# Patient Record
Sex: FEMALE | Race: ASIAN | NOT HISPANIC OR LATINO | URBAN - METROPOLITAN AREA
[De-identification: names, ages, dates, MRNs, and addresses within clinical notes are randomized per-mention and may not be internally consistent; named-entity substitution may affect disease eponyms.]

---

## 2018-04-02 ENCOUNTER — INPATIENT (INPATIENT)
Facility: HOSPITAL | Age: 33
LOS: 1 days | Discharge: HOME | End: 2018-04-04
Attending: SURGERY | Admitting: SURGERY
Payer: MEDICAID

## 2018-04-02 VITALS
OXYGEN SATURATION: 100 % | TEMPERATURE: 99 F | DIASTOLIC BLOOD PRESSURE: 58 MMHG | RESPIRATION RATE: 16 BRPM | HEART RATE: 82 BPM | SYSTOLIC BLOOD PRESSURE: 108 MMHG

## 2018-04-02 DIAGNOSIS — Z98.89 OTHER SPECIFIED POSTPROCEDURAL STATES: Chronic | ICD-10-CM

## 2018-04-02 NOTE — ED ADULT TRIAGE NOTE - CHIEF COMPLAINT QUOTE
pt comes to ED for R sided lower abdominal pain x3 days pt denies NVD pain on urination or blood in urine. pt denies taking anything for pain or PCP. pt LMP was 3/31/18 last BM was today. pt VSS pt appears comfortable NAD

## 2018-04-03 DIAGNOSIS — K37 UNSPECIFIED APPENDICITIS: ICD-10-CM

## 2018-04-03 LAB
ALBUMIN SERPL ELPH-MCNC: 4.2 G/DL — SIGNIFICANT CHANGE UP (ref 3.3–5)
ALP SERPL-CCNC: 49 U/L — SIGNIFICANT CHANGE UP (ref 40–120)
ALT FLD-CCNC: 13 U/L — SIGNIFICANT CHANGE UP (ref 4–33)
APPEARANCE UR: CLEAR — SIGNIFICANT CHANGE UP
AST SERPL-CCNC: 13 U/L — SIGNIFICANT CHANGE UP (ref 4–32)
BASOPHILS # BLD AUTO: 0.05 K/UL — SIGNIFICANT CHANGE UP (ref 0–0.2)
BASOPHILS NFR BLD AUTO: 0.4 % — SIGNIFICANT CHANGE UP (ref 0–2)
BILIRUB SERPL-MCNC: 0.2 MG/DL — SIGNIFICANT CHANGE UP (ref 0.2–1.2)
BILIRUB UR-MCNC: NEGATIVE — SIGNIFICANT CHANGE UP
BLD GP AB SCN SERPL QL: NEGATIVE — SIGNIFICANT CHANGE UP
BLOOD UR QL VISUAL: HIGH
BUN SERPL-MCNC: 14 MG/DL — SIGNIFICANT CHANGE UP (ref 7–23)
CALCIUM SERPL-MCNC: 8.8 MG/DL — SIGNIFICANT CHANGE UP (ref 8.4–10.5)
CHLORIDE SERPL-SCNC: 102 MMOL/L — SIGNIFICANT CHANGE UP (ref 98–107)
CO2 SERPL-SCNC: 30 MMOL/L — SIGNIFICANT CHANGE UP (ref 22–31)
COLOR SPEC: YELLOW — SIGNIFICANT CHANGE UP
CREAT SERPL-MCNC: 0.73 MG/DL — SIGNIFICANT CHANGE UP (ref 0.5–1.3)
EOSINOPHIL # BLD AUTO: 0.27 K/UL — SIGNIFICANT CHANGE UP (ref 0–0.5)
EOSINOPHIL NFR BLD AUTO: 2.3 % — SIGNIFICANT CHANGE UP (ref 0–6)
GLUCOSE SERPL-MCNC: 105 MG/DL — HIGH (ref 70–99)
GLUCOSE UR-MCNC: NEGATIVE — SIGNIFICANT CHANGE UP
HCT VFR BLD CALC: 36.1 % — SIGNIFICANT CHANGE UP (ref 34.5–45)
HGB BLD-MCNC: 11.5 G/DL — SIGNIFICANT CHANGE UP (ref 11.5–15.5)
IMM GRANULOCYTES # BLD AUTO: 0.04 # — SIGNIFICANT CHANGE UP
IMM GRANULOCYTES NFR BLD AUTO: 0.3 % — SIGNIFICANT CHANGE UP (ref 0–1.5)
INR BLD: 1.2 — HIGH (ref 0.88–1.17)
KETONES UR-MCNC: NEGATIVE — SIGNIFICANT CHANGE UP
LEUKOCYTE ESTERASE UR-ACNC: SIGNIFICANT CHANGE UP
LYMPHOCYTES # BLD AUTO: 2.49 K/UL — SIGNIFICANT CHANGE UP (ref 1–3.3)
LYMPHOCYTES # BLD AUTO: 21 % — SIGNIFICANT CHANGE UP (ref 13–44)
MCHC RBC-ENTMCNC: 27.6 PG — SIGNIFICANT CHANGE UP (ref 27–34)
MCHC RBC-ENTMCNC: 31.9 % — LOW (ref 32–36)
MCV RBC AUTO: 86.8 FL — SIGNIFICANT CHANGE UP (ref 80–100)
MONOCYTES # BLD AUTO: 0.9 K/UL — SIGNIFICANT CHANGE UP (ref 0–0.9)
MONOCYTES NFR BLD AUTO: 7.6 % — SIGNIFICANT CHANGE UP (ref 2–14)
MUCOUS THREADS # UR AUTO: SIGNIFICANT CHANGE UP
NEUTROPHILS # BLD AUTO: 8.11 K/UL — HIGH (ref 1.8–7.4)
NEUTROPHILS NFR BLD AUTO: 68.4 % — SIGNIFICANT CHANGE UP (ref 43–77)
NITRITE UR-MCNC: NEGATIVE — SIGNIFICANT CHANGE UP
NRBC # FLD: 0 — SIGNIFICANT CHANGE UP
PH UR: 7 — SIGNIFICANT CHANGE UP (ref 4.6–8)
PLATELET # BLD AUTO: 394 K/UL — SIGNIFICANT CHANGE UP (ref 150–400)
PMV BLD: 9.1 FL — SIGNIFICANT CHANGE UP (ref 7–13)
POTASSIUM SERPL-MCNC: 4.2 MMOL/L — SIGNIFICANT CHANGE UP (ref 3.5–5.3)
POTASSIUM SERPL-SCNC: 4.2 MMOL/L — SIGNIFICANT CHANGE UP (ref 3.5–5.3)
PROT SERPL-MCNC: 7.3 G/DL — SIGNIFICANT CHANGE UP (ref 6–8.3)
PROT UR-MCNC: NEGATIVE MG/DL — SIGNIFICANT CHANGE UP
PROTHROM AB SERPL-ACNC: 13.8 SEC — HIGH (ref 9.8–13.1)
RBC # BLD: 4.16 M/UL — SIGNIFICANT CHANGE UP (ref 3.8–5.2)
RBC # FLD: 12.2 % — SIGNIFICANT CHANGE UP (ref 10.3–14.5)
RBC CASTS # UR COMP ASSIST: 0.2 — SIGNIFICANT CHANGE UP (ref 0–?)
RH IG SCN BLD-IMP: POSITIVE — SIGNIFICANT CHANGE UP
SODIUM SERPL-SCNC: 141 MMOL/L — SIGNIFICANT CHANGE UP (ref 135–145)
SP GR SPEC: 1.01 — SIGNIFICANT CHANGE UP (ref 1–1.04)
SQUAMOUS # UR AUTO: SIGNIFICANT CHANGE UP
UROBILINOGEN FLD QL: 0.2 MG/DL — SIGNIFICANT CHANGE UP
WBC # BLD: 11.86 K/UL — HIGH (ref 3.8–10.5)
WBC # FLD AUTO: 11.86 K/UL — HIGH (ref 3.8–10.5)
WBC UR QL: SIGNIFICANT CHANGE UP (ref 0–?)

## 2018-04-03 PROCEDURE — 74177 CT ABD & PELVIS W/CONTRAST: CPT | Mod: 26

## 2018-04-03 PROCEDURE — 99223 1ST HOSP IP/OBS HIGH 75: CPT | Mod: GC

## 2018-04-03 RX ORDER — ENOXAPARIN SODIUM 100 MG/ML
40 INJECTION SUBCUTANEOUS EVERY 24 HOURS
Qty: 0 | Refills: 0 | Status: DISCONTINUED | OUTPATIENT
Start: 2018-04-03 | End: 2018-04-04

## 2018-04-03 RX ORDER — SODIUM CHLORIDE 9 MG/ML
1000 INJECTION INTRAMUSCULAR; INTRAVENOUS; SUBCUTANEOUS
Qty: 0 | Refills: 0 | Status: DISCONTINUED | OUTPATIENT
Start: 2018-04-03 | End: 2018-04-04

## 2018-04-03 RX ORDER — SODIUM CHLORIDE 9 MG/ML
1000 INJECTION, SOLUTION INTRAVENOUS
Qty: 0 | Refills: 0 | Status: DISCONTINUED | OUTPATIENT
Start: 2018-04-03 | End: 2018-04-03

## 2018-04-03 RX ORDER — SODIUM CHLORIDE 9 MG/ML
1000 INJECTION INTRAMUSCULAR; INTRAVENOUS; SUBCUTANEOUS ONCE
Qty: 0 | Refills: 0 | Status: COMPLETED | OUTPATIENT
Start: 2018-04-03 | End: 2018-04-03

## 2018-04-03 RX ORDER — PIPERACILLIN AND TAZOBACTAM 4; .5 G/20ML; G/20ML
3.38 INJECTION, POWDER, LYOPHILIZED, FOR SOLUTION INTRAVENOUS ONCE
Qty: 0 | Refills: 0 | Status: COMPLETED | OUTPATIENT
Start: 2018-04-03 | End: 2018-04-03

## 2018-04-03 RX ORDER — KETOROLAC TROMETHAMINE 30 MG/ML
30 SYRINGE (ML) INJECTION ONCE
Qty: 0 | Refills: 0 | Status: DISCONTINUED | OUTPATIENT
Start: 2018-04-03 | End: 2018-04-03

## 2018-04-03 RX ORDER — PIPERACILLIN AND TAZOBACTAM 4; .5 G/20ML; G/20ML
3.38 INJECTION, POWDER, LYOPHILIZED, FOR SOLUTION INTRAVENOUS EVERY 8 HOURS
Qty: 0 | Refills: 0 | Status: DISCONTINUED | OUTPATIENT
Start: 2018-04-03 | End: 2018-04-04

## 2018-04-03 RX ADMIN — Medication 30 MILLIGRAM(S): at 00:31

## 2018-04-03 RX ADMIN — PIPERACILLIN AND TAZOBACTAM 25 GRAM(S): 4; .5 INJECTION, POWDER, LYOPHILIZED, FOR SOLUTION INTRAVENOUS at 12:23

## 2018-04-03 RX ADMIN — PIPERACILLIN AND TAZOBACTAM 200 GRAM(S): 4; .5 INJECTION, POWDER, LYOPHILIZED, FOR SOLUTION INTRAVENOUS at 04:26

## 2018-04-03 RX ADMIN — SODIUM CHLORIDE 100 MILLILITER(S): 9 INJECTION, SOLUTION INTRAVENOUS at 09:23

## 2018-04-03 RX ADMIN — ENOXAPARIN SODIUM 40 MILLIGRAM(S): 100 INJECTION SUBCUTANEOUS at 11:31

## 2018-04-03 RX ADMIN — SODIUM CHLORIDE 75 MILLILITER(S): 9 INJECTION INTRAMUSCULAR; INTRAVENOUS; SUBCUTANEOUS at 10:43

## 2018-04-03 RX ADMIN — Medication 30 MILLIGRAM(S): at 00:45

## 2018-04-03 RX ADMIN — PIPERACILLIN AND TAZOBACTAM 25 GRAM(S): 4; .5 INJECTION, POWDER, LYOPHILIZED, FOR SOLUTION INTRAVENOUS at 19:24

## 2018-04-03 RX ADMIN — SODIUM CHLORIDE 1000 MILLILITER(S): 9 INJECTION INTRAMUSCULAR; INTRAVENOUS; SUBCUTANEOUS at 00:31

## 2018-04-03 RX ADMIN — SODIUM CHLORIDE 100 MILLILITER(S): 9 INJECTION, SOLUTION INTRAVENOUS at 08:30

## 2018-04-03 RX ADMIN — SODIUM CHLORIDE 75 MILLILITER(S): 9 INJECTION INTRAMUSCULAR; INTRAVENOUS; SUBCUTANEOUS at 19:24

## 2018-04-03 RX ADMIN — SODIUM CHLORIDE 75 MILLILITER(S): 9 INJECTION INTRAMUSCULAR; INTRAVENOUS; SUBCUTANEOUS at 11:30

## 2018-04-03 NOTE — CONSULT NOTE ADULT - ASSESSMENT
33F PSHx  x3 presenting w/ RLQ abdominal pain x3days, leukocytosis, CT w/ acute non-perforated appendicitis.     Explained recommendation for appendectomy to patient. Discussed risks of surgery vs. non-operative management; patient expressed that she does not want surgery for cosmetic reasons and as her pain is gone w/ the one dose of iv abx.  Reviewed minimally invasive nature of laparoscopic appendectomy, as well as the potential risks of perforation, recurrence and possible need for eventual surgery.      Discussed w/ Attending Dr. Horner.         JODI Armendariz MD (PGY2)    (Please page [facility] B team Surgery, [phone] for questions/concerns.)

## 2018-04-03 NOTE — ED PROVIDER NOTE - OBJECTIVE STATEMENT
32 y/o female no pmh c/o RLQ abd pain x3 days. Pt admits to constant pain, no aggravating or relieving factors. Pt states that she had her menstrual cycle this weekend and thought it would go away on its own. Pt finished her period x1 day ago but pain became  worse today. Pt denies chest pain, sob, n/v/d, dysuria, vaginal bleeding or discharge, numbness, tingling ,weakness, syncope, fever or chills.

## 2018-04-03 NOTE — H&P ADULT - ATTENDING COMMENTS
Pt seen and examined.  Agree with resident eval and plan.  Pt with 3 days of abdominal pain localized now to RLQ.  Pt with CT scan findings of acute appendicitis with thickened cecum and surrounding fluid and inflammation.  Pt reportedly feels much better and alleviation of symptoms likely due to perforation.  Presently still localized.  D/w pt risks and benefits of surgical removal laparoscopically and possibly open and pt wants to treat nonoperatively with antibiotics.  Pt understands that her condition may worsen requiring further intervention either open surgery or IR guided drainage.  Pt understands and insisting on nonoperative course.  IV antibiotics and NPO.

## 2018-04-03 NOTE — H&P ADULT - NSHPPHYSICALEXAM_GEN_ALL_CORE
T(C): 37.1 (04-03-18 @ 06:31)  HR: 68 (04-03-18 @ 06:31) (68 - 82)  BP: 109/68 (04-03-18 @ 06:31) (108/58 - 111/70)  RR: 18 (04-03-18 @ 06:31) (16 - 18)  SpO2: 100% (04-03-18 @ 06:31) (100% - 100%)  Wt(kg): --  Tmax: T(C): , Max: 37.1 (04-03-18 @ 06:31)  Wt(kg): --    General: well developed, well nourished, NAD  Neuro: alert and oriented, no focal deficits, moves all extremities spontaneously  HEENT: NCAT, anicteric, mucosa moist  Respiratory: airway patent, respirations unlabored  Abdomen: soft, nondistended, tender RLQ; no guarding/rebound  Extremities: no edema, sensation and movement grossly intact  Skin: warm, dry, appropriate color

## 2018-04-03 NOTE — ED ADULT NURSE NOTE - OBJECTIVE STATEMENT
Pt received to int-10c A&Ox3 c/o RLQ pain X1week worse today. Pt states she was on her cycle this week, which she thought was contributing to her pain however her cycle ended one day ago and pain was still present worsening since waking up this AM. Denies n/v/d, fever, chills, urinary symptoms. Pt appears comfortably in stretcher, respirations even and unlabored, nad noted at this time. 20G IV placed in R AC, blood work and urine sent, awaiting CT scan. Safety and comfort maintained.

## 2018-04-03 NOTE — CONSULT NOTE ADULT - SUBJECTIVE AND OBJECTIVE BOX
General Surgery Consult Note  B team [id number]    CC: 33y old Female admitted with a chief complaint of abdominal pain    HPI:  33F PSHx  x3 presenting to [facility] ED the night of  complaining of abdominal pain x3 days. Reports pain started 2-3 days prior to presentation, always located in RLQ. Pt admits to constant pain, no aggravating or relieving factors. Pt states that she had her menstrual cycle this weekend and thought it would go away on its own. Pt finished her period x1 day ago but pain became  worse today. Pt denies chest pain, sob, n/v/d, dysuria, vaginal bleeding or discharge, numbness, tingling ,weakness, syncope, fever or chills.    PMHx: Hypotension  Placenta abruptio    PSHx: S/P LASIK surgery  H/O  section    Medications (inpatient):   Medications (PRN):  Allergies: No Known Allergies  (Intolerances: )  Social Hx:     Physical Exam  T(C): 37.1 (18 @ 06:31)  HR: 68 (18 @ 06:31) (68 - 82)  BP: 109/68 (18 @ 06:31) (108/58 - 111/70)  RR: 18 (18 @ 06:31) (16 - 18)  SpO2: 100% (18 @ 06:31) (100% - 100%)  Wt(kg): --  Tmax: T(C): , Max: 37.1 (18 @ 06:31)  Wt(kg): --    General: well developed, well nourished, NAD  Neuro: alert and oriented, no focal deficits, moves all extremities spontaneously  HEENT: NCAT, EOMI, anicteric, mucosa moist  Respiratory: airway patent, respirations unlabored  CVS: regular rate and rhythm  Abdomen: soft, nontender, nondistended  Extremities: no edema, sensation and movement grossly intact  Skin: warm, dry, appropriate color    Labs:                        11.5   11.86 )-----------( 394      ( 2018 00:20 )             36.1     PT/INR - ( 2018 04:00 )   PT: 13.8 SEC;   INR: 1.20                141  |  102  |  14  ----------------------------<  105<H>  4.2   |  30  |  0.73    Ca    8.8      2018 00:20    TPro  7.3  /  Alb  4.2  /  TBili  0.2  /  DBili  x   /  AST  13  /  ALT  13  /  AlkPhos  49  04-03    Urinalysis Basic - ( 2018 01:20 )    Color: YELLOW / Appearance: CLEAR / S.015 / pH: 7.0  Gluc: NEGATIVE / Ketone: NEGATIVE  / Bili: NEGATIVE / Urobili: 0.2 mg/dL   Blood: SMALL / Protein: NEGATIVE mg/dL / Nitrite: NEGATIVE   Leuk Esterase: TRACE / RBC: 0.2 / WBC 10-25   Sq Epi: OCC / Non Sq Epi: x / Bacteria: x            Imaging and other studies: General Surgery Consult Note  B team [id number]    Consultant: Dr. Sherman Horner    CC: 33y old Female admitted with a chief complaint of abdominal pain    HPI:  33F PSHx  x3 presenting to [facility] ED the night of  complaining of abdominal pain x3 days. Reports pain started 2-3 days prior to presentation, always constant & located in RLQ. Denies aggravating or palliating factors. Reports she had her menstrual cycle this weekend and expected pain to improve. However she finished her period x1 day ago & pain worsened. Denies CP, SOB, N/V, dysuria, vaginal bleeding or discharge, numbness, tingling ,weakness, syncope, fever or chills.     In ED VS: T37, P82, /58, RR16, SpO2 100%. Labs notable for WBC11.8. CT demonstrated acute nonperforated appendicitis. 1x dose zosyn. Surgery consulted.     PMHx: Hypotension  Placenta abruptio    PSHx: S/P LASIK surgery  H/O  section x3    Medications (inpatient): x  Medications (PRN): x    Home Medications: (none)  acetaminophen-oxyCODONE 325 mg-5 mg oral tablet: 1 tab(s) orally every 3 hours, As needed, Pain Score 3 - 5 breakthrough pain during the 24 hours after receiving an epidural or intrathecal opioid (27 Aug 2014 16:10)  ibuprofen 600 mg oral tablet: 1 tab(s) orally every 6 hours, As needed, Mild pain or headache (27 Aug 2014 16:10)  Prenatal Multivitamins Prenatal Multivitamins oral tablet: 1 tab(s) orally once a day (27 Aug 2014 16:08)    Allergies: No Known Allergies  (Intolerances: x)    Social Hx: denies tobacco use.    Physical Exam  T(C): 37.1 (18 @ 06:31)  HR: 68 (18 @ 06:31) (68 - 82)  BP: 109/68 (18 @ 06:31) (108/58 - 111/70)  RR: 18 (18 @ 06:31) (16 - 18)  SpO2: 100% (18 @ 06:31) (100% - 100%)  Wt(kg): --  Tmax: T(C): , Max: 37.1 (18 @ 06:31)  Wt(kg): --    General: well developed, well nourished, NAD  Neuro: alert and oriented, no focal deficits, moves all extremities spontaneously  HEENT: NCAT, anicteric, mucosa moist  Respiratory: airway patent, respirations unlabored  Abdomen: soft, nondistended, tender RLQ  Extremities: no edema, sensation and movement grossly intact  Skin: warm, dry, appropriate color    Labs:                        11.5   11.86 )-----------( 394      ( 2018 00:20 )             36.1     PT/INR - ( 2018 04:00 )   PT: 13.8 SEC;   INR: 1.20                141  |  102  |  14  ----------------------------<  105<H>  4.2   |  30  |  0.73    Ca    8.8      2018 00:20    TPro  7.3  /  Alb  4.2  /  TBili  0.2  /  DBili  x   /  AST  13  /  ALT  13  /  AlkPhos  49  -    Urinalysis Basic - ( 2018 01:20 )    Color: YELLOW / Appearance: CLEAR / S.015 / pH: 7.0  Gluc: NEGATIVE / Ketone: NEGATIVE  / Bili: NEGATIVE / Urobili: 0.2 mg/dL   Blood: SMALL / Protein: NEGATIVE mg/dL / Nitrite: NEGATIVE   Leuk Esterase: TRACE / RBC: 0.2 / WBC 10-25   Sq Epi: OCC / Non Sq Epi: x / Bacteria: x      Urine pregnancy test: negative (POC flow sheets)        Imaging and other studies:  CT Abdomen and Pelvis w/ Oral Cont and w/ IV Cont (18 @ 02:38) >  LOWER CHEST: Within normal limits.    LIVER: Within normal limits.  BILE DUCTS: Normal caliber.  GALLBLADDER: Within normal limits.  SPLEEN: Within normal limits.  PANCREAS: Within normal limits.  ADRENALS: Within normal limits.  KIDNEYS/URETERS: Within normal limits.    BLADDER: Within normal limits.  REPRODUCTIVE ORGANS: The uterus and adnexa are within normal limits.     BOWEL: No bowel obstruction. Thickened inflamed appendix measures 8 mm.   Thickening of the base of the cecum. Fluid-filled colon.  PERITONEUM: No fluid collection, free air, or ascites.  VESSELS: Within normal limits.  RETROPERITONEUM: No lymphadenopathy.    ABDOMINAL WALL: Within normal limits.  BONES: Within normal limits.    IMPRESSION: Acute appendicitis. No abscess.

## 2018-04-03 NOTE — H&P ADULT - NSHPLABSRESULTS_GEN_ALL_CORE
Labs:                        11.5   11.86 )-----------( 394      ( 2018 00:20 )             36.1     PT/INR - ( 2018 04:00 )   PT: 13.8 SEC;   INR: 1.20                141  |  102  |  14  ----------------------------<  105<H>  4.2   |  30  |  0.73    Ca    8.8      2018 00:20    TPro  7.3  /  Alb  4.2  /  TBili  0.2  /  DBili  x   /  AST  13  /  ALT  13  /  AlkPhos  49  -    Urinalysis Basic - ( 2018 01:20 )    Color: YELLOW / Appearance: CLEAR / S.015 / pH: 7.0  Gluc: NEGATIVE / Ketone: NEGATIVE  / Bili: NEGATIVE / Urobili: 0.2 mg/dL   Blood: SMALL / Protein: NEGATIVE mg/dL / Nitrite: NEGATIVE   Leuk Esterase: TRACE / RBC: 0.2 / WBC 10-25   Sq Epi: OCC / Non Sq Epi: x / Bacteria: x      Urine pregnancy test: negative (POC flow sheets)        Imaging and other studies:  CT Abdomen and Pelvis w/ Oral Cont and w/ IV Cont (18 @ 02:38) >  LOWER CHEST: Within normal limits.    LIVER: Within normal limits.  BILE DUCTS: Normal caliber.  GALLBLADDER: Within normal limits.  SPLEEN: Within normal limits.  PANCREAS: Within normal limits.  ADRENALS: Within normal limits.  KIDNEYS/URETERS: Within normal limits.    BLADDER: Within normal limits.  REPRODUCTIVE ORGANS: The uterus and adnexa are within normal limits.     BOWEL: No bowel obstruction. Thickened inflamed appendix measures 8 mm.   Thickening of the base of the cecum. Fluid-filled colon.  PERITONEUM: No fluid collection, free air, or ascites.  VESSELS: Within normal limits.  RETROPERITONEUM: No lymphadenopathy.    ABDOMINAL WALL: Within normal limits.  BONES: Within normal limits.    IMPRESSION: Acute appendicitis. No abscess.

## 2018-04-03 NOTE — ED PROVIDER NOTE - PROGRESS NOTE DETAILS
Lee - Pt signed out to me. Diagnosed with acute appendicitis, given Toradol and Zosyn. Sx has seen the pt but she does not want to have the appendix removed 2/2 cosmetic reasons. Sx attending to see pt and discuss options. VSS pt comfortable.

## 2018-04-03 NOTE — H&P ADULT - HISTORY OF PRESENT ILLNESS
33F PSHx  x3 presenting to [facility] ED the night of  complaining of abdominal pain x3 days. Reports pain started 2-3 days prior to presentation, always constant & located in RLQ. Denies aggravating or palliating factors. Reports she had her menstrual cycle this weekend and expected pain to improve. However she finished her period x1 day ago & pain worsened. Denies CP, SOB, N/V, dysuria, vaginal bleeding or discharge, numbness, tingling ,weakness, syncope, fever or chills.     In ED VS: T37, P82, /58, RR16, SpO2 100%. Labs notable for WBC11.8. CT demonstrated acute nonperforated appendicitis. 1x dose zosyn. Surgery consulted.

## 2018-04-03 NOTE — H&P ADULT - ASSESSMENT
33F PSHx  x3 presenting w/ RLQ abdominal pain x3days, leukocytosis, CT w/ acute non-perforated appendicitis.     Recommended surgery, however patient prefers antibiotics. Patient w/ pain x3-4 days, w/ abrupt cessation of pain, possibly now perforated.     #Admit to General Surgery, B team; Attending Dr. Sherman Horner    Diet: NPO  IVF: LR@100/h  DVTppx: lovenox, SCD's  Abx: con't zosyn      Patient seen & examined w/ Attending Dr. Horner.         JODI Armendariz MD (PGY2)    (Please page [facility] B team Surgery, [phone] for questions/concerns.)

## 2018-04-04 ENCOUNTER — TRANSCRIPTION ENCOUNTER (OUTPATIENT)
Age: 33
End: 2018-04-04

## 2018-04-04 VITALS
DIASTOLIC BLOOD PRESSURE: 60 MMHG | TEMPERATURE: 98 F | HEART RATE: 66 BPM | OXYGEN SATURATION: 100 % | RESPIRATION RATE: 16 BRPM | SYSTOLIC BLOOD PRESSURE: 115 MMHG

## 2018-04-04 LAB
BASOPHILS # BLD AUTO: 0.03 K/UL — SIGNIFICANT CHANGE UP (ref 0–0.2)
BASOPHILS NFR BLD AUTO: 0.5 % — SIGNIFICANT CHANGE UP (ref 0–2)
BUN SERPL-MCNC: 9 MG/DL — SIGNIFICANT CHANGE UP (ref 7–23)
CALCIUM SERPL-MCNC: 7.5 MG/DL — LOW (ref 8.4–10.5)
CHLORIDE SERPL-SCNC: 107 MMOL/L — SIGNIFICANT CHANGE UP (ref 98–107)
CO2 SERPL-SCNC: 23 MMOL/L — SIGNIFICANT CHANGE UP (ref 22–31)
CREAT SERPL-MCNC: 0.77 MG/DL — SIGNIFICANT CHANGE UP (ref 0.5–1.3)
EOSINOPHIL # BLD AUTO: 0.22 K/UL — SIGNIFICANT CHANGE UP (ref 0–0.5)
EOSINOPHIL NFR BLD AUTO: 3.8 % — SIGNIFICANT CHANGE UP (ref 0–6)
GLUCOSE SERPL-MCNC: 83 MG/DL — SIGNIFICANT CHANGE UP (ref 70–99)
HCG SERPL-ACNC: < 5 MIU/ML — SIGNIFICANT CHANGE UP
HCT VFR BLD CALC: 29.2 % — LOW (ref 34.5–45)
HGB BLD-MCNC: 9.4 G/DL — LOW (ref 11.5–15.5)
IMM GRANULOCYTES # BLD AUTO: 0.02 # — SIGNIFICANT CHANGE UP
IMM GRANULOCYTES NFR BLD AUTO: 0.3 % — SIGNIFICANT CHANGE UP (ref 0–1.5)
LYMPHOCYTES # BLD AUTO: 1.78 K/UL — SIGNIFICANT CHANGE UP (ref 1–3.3)
LYMPHOCYTES # BLD AUTO: 30.7 % — SIGNIFICANT CHANGE UP (ref 13–44)
MAGNESIUM SERPL-MCNC: 2.1 MG/DL — SIGNIFICANT CHANGE UP (ref 1.6–2.6)
MCHC RBC-ENTMCNC: 28.1 PG — SIGNIFICANT CHANGE UP (ref 27–34)
MCHC RBC-ENTMCNC: 32.2 % — SIGNIFICANT CHANGE UP (ref 32–36)
MCV RBC AUTO: 87.4 FL — SIGNIFICANT CHANGE UP (ref 80–100)
MONOCYTES # BLD AUTO: 0.43 K/UL — SIGNIFICANT CHANGE UP (ref 0–0.9)
MONOCYTES NFR BLD AUTO: 7.4 % — SIGNIFICANT CHANGE UP (ref 2–14)
NEUTROPHILS # BLD AUTO: 3.32 K/UL — SIGNIFICANT CHANGE UP (ref 1.8–7.4)
NEUTROPHILS NFR BLD AUTO: 57.3 % — SIGNIFICANT CHANGE UP (ref 43–77)
NRBC # FLD: 0 — SIGNIFICANT CHANGE UP
PHOSPHATE SERPL-MCNC: 2.8 MG/DL — SIGNIFICANT CHANGE UP (ref 2.5–4.5)
PLATELET # BLD AUTO: 293 K/UL — SIGNIFICANT CHANGE UP (ref 150–400)
PMV BLD: 9.2 FL — SIGNIFICANT CHANGE UP (ref 7–13)
POTASSIUM SERPL-MCNC: 4 MMOL/L — SIGNIFICANT CHANGE UP (ref 3.5–5.3)
POTASSIUM SERPL-SCNC: 4 MMOL/L — SIGNIFICANT CHANGE UP (ref 3.5–5.3)
RBC # BLD: 3.34 M/UL — LOW (ref 3.8–5.2)
RBC # FLD: 11.8 % — SIGNIFICANT CHANGE UP (ref 10.3–14.5)
SODIUM SERPL-SCNC: 141 MMOL/L — SIGNIFICANT CHANGE UP (ref 135–145)
WBC # BLD: 5.8 K/UL — SIGNIFICANT CHANGE UP (ref 3.8–10.5)
WBC # FLD AUTO: 5.8 K/UL — SIGNIFICANT CHANGE UP (ref 3.8–10.5)

## 2018-04-04 RX ADMIN — Medication 1 TABLET(S): at 17:48

## 2018-04-04 RX ADMIN — PIPERACILLIN AND TAZOBACTAM 25 GRAM(S): 4; .5 INJECTION, POWDER, LYOPHILIZED, FOR SOLUTION INTRAVENOUS at 03:36

## 2018-04-04 RX ADMIN — SODIUM CHLORIDE 75 MILLILITER(S): 9 INJECTION INTRAMUSCULAR; INTRAVENOUS; SUBCUTANEOUS at 03:36

## 2018-04-04 NOTE — DISCHARGE NOTE ADULT - PATIENT PORTAL LINK FT
You can access the [facility] Patient Portal, offered by [facility], by registering with the following website: [email or web address]

## 2018-04-04 NOTE — PROGRESS NOTE ADULT - ATTENDING COMMENTS
I have personally interviewed and examined this patient, reviewed pertinent labs and imaging, and discussed the case with colleagues, residents, and physician assistants on B Team rounds.    Plan  feels much better, only mild tenderness, tolerating clears - advance diet  switch to po antibiotics  potential dc later today if tolerating diet and feels well

## 2018-04-04 NOTE — PROGRESS NOTE ADULT - ASSESSMENT
33F PSHx  x3 presenting w/ RLQ abdominal pain x3days, leukocytosis, CT w/ acute non-perforated appendicitis.     - DVT ppx lovenox  - IV zosyn for antibiosis  - IVF  - Clear liquid diet  - Serial abdominal exams

## 2018-04-04 NOTE — DISCHARGE NOTE ADULT - PLAN OF CARE
s/p antibiotics ACTIVITY: you may return to your usual level of physical activity. If you are taking narcotic pain medication DO NOT drive a car, operate machinery or make important decisions.    DIET: Return to your usual diet.    NOTIFY YOUR SURGEON IF: increased abdominal pain, any fever (over 100.4 F) persistent nausea/vomiting    Please follow up with your primary care physician in 1-2 weeks regarding your hospitalization.  Please follow up with your surgeon, Dr. Horner if you are considering surgery. Please call office to make an appointment

## 2018-04-04 NOTE — PROGRESS NOTE ADULT - SUBJECTIVE AND OBJECTIVE BOX
B Team Surgery Progress Note    SUBJECTIVE: Pt seen and examined at bedside. Patient comfortable and in no-apparent distress. No nausea, vomiting, diarrhea. Pain is controlled.       Vital Signs Last 24 Hrs  T(C): 36.8 (2018 05:23), Max: 36.8 (2018 14:23)  T(F): 98.2 (2018 05:23), Max: 98.2 (2018 14:23)  HR: 73 (2018 05:23) (70 - 73)  BP: 102/67 (2018 05:23) (92/54 - 112/60)  BP(mean): --  RR: 16 (2018 05:23) (16 - 18)  SpO2: 100% (2018 05:23) (99% - 100%)    Physical Exam:  General Appearance: Appears well, NAD  Respiratory: No labored breathing  CV: Pulse regularly present  Abdomen: Soft, nontense, pain to palpation in the RLQ      LABS:                        9.4    5.80  )-----------( 293      ( 2018 05:30 )             29.2     04-04    141  |  107  |  9   ----------------------------<  83  4.0   |  23  |  0.77    Ca    7.5<L>      2018 05:30  Phos  2.8     -  Mg     2.1     -04    TPro  7.3  /  Alb  4.2  /  TBili  0.2  /  DBili  x   /  AST  13  /  ALT  13  /  AlkPhos  49  04-03    PT/INR - ( 2018 04:00 )   PT: 13.8 SEC;   INR: 1.20            Urinalysis Basic - ( 2018 01:20 )    Color: YELLOW / Appearance: CLEAR / S.015 / pH: 7.0  Gluc: NEGATIVE / Ketone: NEGATIVE  / Bili: NEGATIVE / Urobili: 0.2 mg/dL   Blood: SMALL / Protein: NEGATIVE mg/dL / Nitrite: NEGATIVE   Leuk Esterase: TRACE / RBC: 0.2 / WBC 10-25   Sq Epi: OCC / Non Sq Epi: x / Bacteria: x        INs and OUTs:    18 @ 07:01  -  18 @ 07:00  --------------------------------------------------------  IN: 2000 mL / OUT: 600 mL / NET: 1400 mL

## 2018-04-04 NOTE — DISCHARGE NOTE ADULT - CARE PROVIDER_API CALL
Sherman Horner (MD), Surgery  [address] [place] [address]  [place] [address]  Phone: [phone]  Fax: [phone]

## 2018-04-04 NOTE — DISCHARGE NOTE ADULT - HOSPITAL COURSE
Pt 33F PSHx  x3 presenting to [facility] ED the night of  complaining of abdominal pain x3 days. Reports pain started 2-3 days prior to presentation, always constant & located in RLQ. Denies aggravating or palliating factors. Reports she had her menstrual cycle this weekend and expected pain to improve. However she finished her period x1 day ago & pain worsened. Denies CP, SOB, N/V, dysuria, vaginal bleeding or discharge, numbness, tingling ,weakness, syncope, fever or chills.     Pt had CT abdomen and pelvis which showed Acute appendicitis. No abscess. She was started on zosyn and was offered surgery however she refused.

## 2018-04-04 NOTE — DISCHARGE NOTE ADULT - MEDICATION SUMMARY - MEDICATIONS TO TAKE
I will START or STAY ON the medications listed below when I get home from the hospital:    oxyCODONE-acetaminophen 5 mg-325 mg oral tablet  -- 1 tab(s) by mouth every 6 hours, As Needed MDD:4  -- Caution federal law prohibits the transfer of this drug to any person other  than the person for whom it was prescribed.  May cause drowsiness.  Alcohol may intensify this effect.  Use care when operating dangerous machinery.  This prescription cannot be refilled.  This product contains acetaminophen.  Do not use  with any other product containing acetaminophen to prevent possible liver damage.  Using more of this medication than prescribed may cause serious breathing problems.    -- Indication: For pain    amoxicillin-clavulanate 875 mg-125 mg oral tablet  -- 1 tab(s) by mouth 2 times a day  -- Indication: For Antibiotic

## 2018-04-04 NOTE — DISCHARGE NOTE ADULT - CARE PLAN
Principal Discharge DX:	Acute appendicitis, unspecified acute appendicitis type  Goal:	s/p antibiotics  Assessment and plan of treatment:	ACTIVITY: you may return to your usual level of physical activity. If you are taking narcotic pain medication DO NOT drive a car, operate machinery or make important decisions.    DIET: Return to your usual diet.    NOTIFY YOUR SURGEON IF: increased abdominal pain, any fever (over 100.4 F) persistent nausea/vomiting    Please follow up with your primary care physician in 1-2 weeks regarding your hospitalization.  Please follow up with your surgeon, Dr. Horner if you are considering surgery. Please call office to make an appointment

## 2018-04-17 ENCOUNTER — APPOINTMENT (OUTPATIENT)
Dept: SURGERY | Facility: CLINIC | Age: 33
End: 2018-04-17
Payer: MEDICAID

## 2018-04-17 VITALS
SYSTOLIC BLOOD PRESSURE: 95 MMHG | HEIGHT: 61.5 IN | DIASTOLIC BLOOD PRESSURE: 68 MMHG | TEMPERATURE: 98 F | OXYGEN SATURATION: 98 % | WEIGHT: 109 LBS | HEART RATE: 76 BPM | RESPIRATION RATE: 18 BRPM | BODY MASS INDEX: 20.32 KG/M2

## 2018-04-17 DIAGNOSIS — Z83.3 FAMILY HISTORY OF DIABETES MELLITUS: ICD-10-CM

## 2018-04-17 PROCEDURE — 99213 OFFICE O/P EST LOW 20 MIN: CPT

## 2019-11-29 ENCOUNTER — EMERGENCY (EMERGENCY)
Facility: HOSPITAL | Age: 34
LOS: 1 days | Discharge: HOME | End: 2019-11-29
Attending: EMERGENCY MEDICINE | Admitting: EMERGENCY MEDICINE
Payer: MEDICAID

## 2019-11-29 VITALS
SYSTOLIC BLOOD PRESSURE: 103 MMHG | RESPIRATION RATE: 18 BRPM | TEMPERATURE: 98 F | HEART RATE: 73 BPM | OXYGEN SATURATION: 100 % | DIASTOLIC BLOOD PRESSURE: 40 MMHG

## 2019-11-29 DIAGNOSIS — Z98.89 OTHER SPECIFIED POSTPROCEDURAL STATES: Chronic | ICD-10-CM

## 2019-11-29 LAB
ALBUMIN SERPL ELPH-MCNC: 4.1 G/DL — SIGNIFICANT CHANGE UP (ref 3.3–5)
ALP SERPL-CCNC: 47 U/L — SIGNIFICANT CHANGE UP (ref 40–120)
ALT FLD-CCNC: 14 U/L — SIGNIFICANT CHANGE UP (ref 4–33)
ANION GAP SERPL CALC-SCNC: 10 MMO/L — SIGNIFICANT CHANGE UP (ref 7–14)
AST SERPL-CCNC: 16 U/L — SIGNIFICANT CHANGE UP (ref 4–32)
BASOPHILS # BLD AUTO: 0.07 K/UL — SIGNIFICANT CHANGE UP (ref 0–0.2)
BASOPHILS NFR BLD AUTO: 0.9 % — SIGNIFICANT CHANGE UP (ref 0–2)
BILIRUB SERPL-MCNC: < 0.2 MG/DL — LOW (ref 0.2–1.2)
BUN SERPL-MCNC: 25 MG/DL — HIGH (ref 7–23)
CALCIUM SERPL-MCNC: 8.9 MG/DL — SIGNIFICANT CHANGE UP (ref 8.4–10.5)
CHLORIDE SERPL-SCNC: 106 MMOL/L — SIGNIFICANT CHANGE UP (ref 98–107)
CO2 SERPL-SCNC: 25 MMOL/L — SIGNIFICANT CHANGE UP (ref 22–31)
CREAT SERPL-MCNC: 0.72 MG/DL — SIGNIFICANT CHANGE UP (ref 0.5–1.3)
EOSINOPHIL # BLD AUTO: 0.28 K/UL — SIGNIFICANT CHANGE UP (ref 0–0.5)
EOSINOPHIL NFR BLD AUTO: 3.7 % — SIGNIFICANT CHANGE UP (ref 0–6)
GLUCOSE SERPL-MCNC: 93 MG/DL — SIGNIFICANT CHANGE UP (ref 70–99)
HCT VFR BLD CALC: 35.7 % — SIGNIFICANT CHANGE UP (ref 34.5–45)
HGB BLD-MCNC: 11.5 G/DL — SIGNIFICANT CHANGE UP (ref 11.5–15.5)
IMM GRANULOCYTES NFR BLD AUTO: 0.3 % — SIGNIFICANT CHANGE UP (ref 0–1.5)
LYMPHOCYTES # BLD AUTO: 2.82 K/UL — SIGNIFICANT CHANGE UP (ref 1–3.3)
LYMPHOCYTES # BLD AUTO: 37.7 % — SIGNIFICANT CHANGE UP (ref 13–44)
MCHC RBC-ENTMCNC: 27.8 PG — SIGNIFICANT CHANGE UP (ref 27–34)
MCHC RBC-ENTMCNC: 32.2 % — SIGNIFICANT CHANGE UP (ref 32–36)
MCV RBC AUTO: 86.4 FL — SIGNIFICANT CHANGE UP (ref 80–100)
MONOCYTES # BLD AUTO: 0.55 K/UL — SIGNIFICANT CHANGE UP (ref 0–0.9)
MONOCYTES NFR BLD AUTO: 7.4 % — SIGNIFICANT CHANGE UP (ref 2–14)
NEUTROPHILS # BLD AUTO: 3.74 K/UL — SIGNIFICANT CHANGE UP (ref 1.8–7.4)
NEUTROPHILS NFR BLD AUTO: 50 % — SIGNIFICANT CHANGE UP (ref 43–77)
NRBC # FLD: 0 K/UL — SIGNIFICANT CHANGE UP (ref 0–0)
PLATELET # BLD AUTO: 344 K/UL — SIGNIFICANT CHANGE UP (ref 150–400)
PMV BLD: 9.1 FL — SIGNIFICANT CHANGE UP (ref 7–13)
POTASSIUM SERPL-MCNC: 4.2 MMOL/L — SIGNIFICANT CHANGE UP (ref 3.5–5.3)
POTASSIUM SERPL-SCNC: 4.2 MMOL/L — SIGNIFICANT CHANGE UP (ref 3.5–5.3)
PROT SERPL-MCNC: 6.8 G/DL — SIGNIFICANT CHANGE UP (ref 6–8.3)
RBC # BLD: 4.13 M/UL — SIGNIFICANT CHANGE UP (ref 3.8–5.2)
RBC # FLD: 12.7 % — SIGNIFICANT CHANGE UP (ref 10.3–14.5)
SODIUM SERPL-SCNC: 141 MMOL/L — SIGNIFICANT CHANGE UP (ref 135–145)
WBC # BLD: 7.48 K/UL — SIGNIFICANT CHANGE UP (ref 3.8–10.5)
WBC # FLD AUTO: 7.48 K/UL — SIGNIFICANT CHANGE UP (ref 3.8–10.5)

## 2019-11-29 PROCEDURE — 99284 EMERGENCY DEPT VISIT MOD MDM: CPT

## 2019-11-29 PROCEDURE — 70450 CT HEAD/BRAIN W/O DYE: CPT | Mod: 26

## 2019-11-29 NOTE — ED ADULT NURSE NOTE - OBJECTIVE STATEMENT
Pt received in intake room 10C, a/o x4. Pt comes in for c/o headache for the last few days, reporting that she has been experiencing these symptoms for a while and came in for evaluation d/t nothing relieving the pain. Pt appears in no acute distress, unremarkable neuro exam by MD Fernandez and vs as noted. Pt currently awaiting CTH and results from lab work. Will monitor and await further orders.

## 2019-11-29 NOTE — ED PROVIDER NOTE - OBJECTIVE STATEMENT
35yo F no PMH p/w complaints right sided headache x2 weeks. pt reports that for the last 2 weeks when she wakes up in the morning she has a mild headache that feels like occipital pressure with mild lightheadedness. the lightheadedness subsides in a few hours but the headache progressively worsens throughout the day, becoming sharp in quality and right sided. worse with movement. Denies photophobia, phonophobia. no nausea or vomiting. No LOC. no trauma or falls. doesn't have history of headaches. taking ibuprofen and tylenol with improvement of symptoms.

## 2019-11-29 NOTE — ED PROVIDER NOTE - PHYSICAL EXAMINATION
Gen: WDWN, NAD  HEENT: EOMI, no nasal discharge, mucous membranes moist  CV: RRR, +S1/S2, no M/R/G  Resp: CTAB, no W/R/R  GI: Abdomen soft non-distended, NTTP  MSK: No open wounds, no bruising, no LE edema  Neuro: A&Ox4, following commands, moving all four extremities spontaneously, CN 2-12 intact, no focal neurological deficits, sensation grossly intact, 5/5 strength x4   Psych: appropriate mood

## 2019-11-29 NOTE — ED PROVIDER NOTE - CLINICAL SUMMARY MEDICAL DECISION MAKING FREE TEXT BOX
33yo F p/w 2 weeks right sided headache without fever, LOC, nausea, vomiting. No history of migraines, no trauma or falls. low suspicion for intracranial pathology given history and clinical picture. Pt states she would feel more comfortable having imaging performed to know everything is okay. Will obtain labs, CT head and discharge home with neurology follow up.

## 2019-11-29 NOTE — ED PROVIDER NOTE - PATIENT PORTAL LINK FT
You can access the FollowMyHealth Patient Portal offered by [facility] by registering at the following website: [email or web address]. By joining [employer]’s FollowMyHealth portal, you will also be able to view your health information using other applications (apps) compatible with our system.

## 2019-11-29 NOTE — ED PROVIDER NOTE - PROGRESS NOTE DETAILS
Pt with paranasal sinus disease on CT, no hemorrhage, mass effect or infarct. Will discharge pt with ENT follow up and supportive care instructions. pt agrees with this plan

## 2019-11-29 NOTE — ED PROVIDER NOTE - NSFOLLOWUPINSTRUCTIONS_ED_ALL_ED_FT
you were seen in the emergency department today for a headache.     you had labs and a CT head performed which did not show any acute abnormalities.     Please follow up with your primary care doctor in the next several days.     Follow up with neurology.     Continue taking ibuprofen 600mg or tylenol 650mg every 6 hours for pain as needed.     Return to the emergency department if you develop any new or worsening symptoms including but not limited to the following: worsening headache, change in vision, weakness, loss of consciousness, nausea, vomiting, fevers. you were seen in the emergency department today for a headache.     you had labs and a CT head performed which did not show any acute abnormalities.     Please follow up with your primary care doctor in the next several days.     Follow up with ENT.     Continue taking ibuprofen 600mg or tylenol 650mg every 6 hours for pain as needed.     you can take Flonase and Claritin.      Return to the emergency department if you develop any new or worsening symptoms including but not limited to the following: worsening headache, change in vision, weakness, loss of consciousness, nausea, vomiting, fevers.

## 2019-11-29 NOTE — ED PROVIDER NOTE - ATTENDING CONTRIBUTION TO CARE
34F pmxh none, R side HA x 2 weeks, dull pain, lightheadedness.  HA progressive throughout day.  Sharp, worse with movement or moving eyes, some impv with tylenol motrin, no h/o HA or migraine, no recent illness. No SOB.  No trauma.  pMXH none. no meds.  No fever no vomiting, no LOC.  Pt requesting CT as pt has not had this HA before.  unlikely SAH or meningitis, normal exam including gait, low risk age group.  follow up with neurology clinic.   VS:  unremarkable    GEN - NAD; well appearing; A+O x3   HEAD - NC/AT     ENT - PEERL, EOMI, mucous membranes  moist , no discharge      NECK: Neck supple, non-tender without lymphadenopathy, no masses, no JVD  PULM - CTA b/l,  symmetric breath sounds  COR -  normal heart sounds    ABD - , ND, NT, soft,  BACK - no CVA tenderness, nontender spine     EXTREMS - no edema, no deformity, warm and well perfused    SKIN - no rash or bruising      NEUROLOGIC - alert, CN 2-12 intact, sensation nl, motor no focal deficit. 34F pmxh none, R side HA x 2 weeks, dull pain, lightheadedness.  HA progressive throughout day.  Sharp, worse with movement or moving eyes, some impv with tylenol motrin, no h/o HA or migraine, no recent illness. No SOB.  No trauma.  pMXH none. no meds.  No fever no vomiting, no LOC.  Pt requesting CT as pt has not had this HA before.  unlikely SAH or meningitis, normal exam including gait, low risk age group.  follow up with neurology clinic.   VS:  unremarkable    GEN - NAD; malaise; A+O x3   HEAD - NC/AT     ENT - PEERL, EOMI, mucous membranes  moist , no discharge      NECK: Neck supple, non-tender without lymphadenopathy, no masses, no JVD  PULM - CTA b/l,  symmetric breath sounds  COR -  normal heart sounds    ABD - , ND, NT, soft,  BACK - no CVA tenderness, nontender spine     EXTREMS - no edema, no deformity, warm and well perfused    SKIN - no rash or bruising      NEUROLOGIC - alert, CN 2-12 intact, sensation nl, motor no focal deficit.

## 2019-11-29 NOTE — ED PROVIDER NOTE - NS ED ROS FT
Gen: Denies fever  CV: Denies chest pain, palpitations  Skin: Denies rash, erythema, color changes  Resp: Denies SOB, cough  GI: Denies abd pain, nausea, vomiting  Msk: Denies back pain, LE swelling, extremity pain  : Denies dysuria, increased frequency  Neuro: +headache, dizziness Denies LOC, weakness

## 2019-11-29 NOTE — ED ADULT TRIAGE NOTE - CHIEF COMPLAINT QUOTE
Pt c/o right sided headache radiating to the back of the head x2 weeks. Denies falls/head injury. Denies n/v. c/o slight dizziness. Pt took 2 Excedrin earlier with some relief. Denies PMH.

## 2021-08-16 ENCOUNTER — EMERGENCY (EMERGENCY)
Facility: HOSPITAL | Age: 36
LOS: 1 days | Discharge: HOME | End: 2021-08-16
Attending: STUDENT IN AN ORGANIZED HEALTH CARE EDUCATION/TRAINING PROGRAM | Admitting: STUDENT IN AN ORGANIZED HEALTH CARE EDUCATION/TRAINING PROGRAM
Payer: MEDICAID

## 2021-08-16 VITALS
TEMPERATURE: 98 F | RESPIRATION RATE: 16 BRPM | DIASTOLIC BLOOD PRESSURE: 47 MMHG | HEIGHT: 62 IN | SYSTOLIC BLOOD PRESSURE: 88 MMHG | HEART RATE: 78 BPM | OXYGEN SATURATION: 100 %

## 2021-08-16 DIAGNOSIS — Z98.89 OTHER SPECIFIED POSTPROCEDURAL STATES: Chronic | ICD-10-CM

## 2021-08-16 LAB
ALBUMIN SERPL ELPH-MCNC: 4 G/DL — SIGNIFICANT CHANGE UP (ref 3.3–5)
ALP SERPL-CCNC: 46 U/L — SIGNIFICANT CHANGE UP (ref 40–120)
ALT FLD-CCNC: 14 U/L — SIGNIFICANT CHANGE UP (ref 4–33)
ANION GAP SERPL CALC-SCNC: 11 MMOL/L — SIGNIFICANT CHANGE UP (ref 7–14)
AST SERPL-CCNC: 17 U/L — SIGNIFICANT CHANGE UP (ref 4–32)
BASOPHILS # BLD AUTO: 0.02 K/UL — SIGNIFICANT CHANGE UP (ref 0–0.2)
BASOPHILS NFR BLD AUTO: 0.3 % — SIGNIFICANT CHANGE UP (ref 0–2)
BILIRUB SERPL-MCNC: 0.2 MG/DL — SIGNIFICANT CHANGE UP (ref 0.2–1.2)
BLD GP AB SCN SERPL QL: NEGATIVE — SIGNIFICANT CHANGE UP
BUN SERPL-MCNC: 10 MG/DL — SIGNIFICANT CHANGE UP (ref 7–23)
CALCIUM SERPL-MCNC: 8.9 MG/DL — SIGNIFICANT CHANGE UP (ref 8.4–10.5)
CHLORIDE SERPL-SCNC: 105 MMOL/L — SIGNIFICANT CHANGE UP (ref 98–107)
CO2 SERPL-SCNC: 21 MMOL/L — LOW (ref 22–31)
CREAT SERPL-MCNC: 0.6 MG/DL — SIGNIFICANT CHANGE UP (ref 0.5–1.3)
EOSINOPHIL # BLD AUTO: 0.11 K/UL — SIGNIFICANT CHANGE UP (ref 0–0.5)
EOSINOPHIL NFR BLD AUTO: 1.7 % — SIGNIFICANT CHANGE UP (ref 0–6)
GLUCOSE SERPL-MCNC: 91 MG/DL — SIGNIFICANT CHANGE UP (ref 70–99)
HCG SERPL-ACNC: SIGNIFICANT CHANGE UP MIU/ML
HCT VFR BLD CALC: 33 % — LOW (ref 34.5–45)
HGB BLD-MCNC: 10.7 G/DL — LOW (ref 11.5–15.5)
IANC: 3.39 K/UL — SIGNIFICANT CHANGE UP (ref 1.5–8.5)
IMM GRANULOCYTES NFR BLD AUTO: 0.3 % — SIGNIFICANT CHANGE UP (ref 0–1.5)
LYMPHOCYTES # BLD AUTO: 2.25 K/UL — SIGNIFICANT CHANGE UP (ref 1–3.3)
LYMPHOCYTES # BLD AUTO: 35.4 % — SIGNIFICANT CHANGE UP (ref 13–44)
MCHC RBC-ENTMCNC: 27.6 PG — SIGNIFICANT CHANGE UP (ref 27–34)
MCHC RBC-ENTMCNC: 32.4 GM/DL — SIGNIFICANT CHANGE UP (ref 32–36)
MCV RBC AUTO: 85.3 FL — SIGNIFICANT CHANGE UP (ref 80–100)
MONOCYTES # BLD AUTO: 0.57 K/UL — SIGNIFICANT CHANGE UP (ref 0–0.9)
MONOCYTES NFR BLD AUTO: 9 % — SIGNIFICANT CHANGE UP (ref 2–14)
NEUTROPHILS # BLD AUTO: 3.39 K/UL — SIGNIFICANT CHANGE UP (ref 1.8–7.4)
NEUTROPHILS NFR BLD AUTO: 53.3 % — SIGNIFICANT CHANGE UP (ref 43–77)
NRBC # BLD: 0 /100 WBCS — SIGNIFICANT CHANGE UP
NRBC # FLD: 0 K/UL — SIGNIFICANT CHANGE UP
PLATELET # BLD AUTO: 251 K/UL — SIGNIFICANT CHANGE UP (ref 150–400)
POTASSIUM SERPL-MCNC: 4.5 MMOL/L — SIGNIFICANT CHANGE UP (ref 3.5–5.3)
POTASSIUM SERPL-SCNC: 4.5 MMOL/L — SIGNIFICANT CHANGE UP (ref 3.5–5.3)
PROT SERPL-MCNC: 6.5 G/DL — SIGNIFICANT CHANGE UP (ref 6–8.3)
RBC # BLD: 3.87 M/UL — SIGNIFICANT CHANGE UP (ref 3.8–5.2)
RBC # FLD: 12.2 % — SIGNIFICANT CHANGE UP (ref 10.3–14.5)
RH IG SCN BLD-IMP: POSITIVE — SIGNIFICANT CHANGE UP
SODIUM SERPL-SCNC: 137 MMOL/L — SIGNIFICANT CHANGE UP (ref 135–145)
WBC # BLD: 6.36 K/UL — SIGNIFICANT CHANGE UP (ref 3.8–10.5)
WBC # FLD AUTO: 6.36 K/UL — SIGNIFICANT CHANGE UP (ref 3.8–10.5)

## 2021-08-16 PROCEDURE — 99285 EMERGENCY DEPT VISIT HI MDM: CPT

## 2021-08-16 PROCEDURE — 76830 TRANSVAGINAL US NON-OB: CPT | Mod: 26

## 2021-08-16 RX ORDER — SODIUM CHLORIDE 9 MG/ML
1000 INJECTION INTRAMUSCULAR; INTRAVENOUS; SUBCUTANEOUS ONCE
Refills: 0 | Status: COMPLETED | OUTPATIENT
Start: 2021-08-16 | End: 2021-08-16

## 2021-08-16 RX ADMIN — SODIUM CHLORIDE 1000 MILLILITER(S): 9 INJECTION INTRAMUSCULAR; INTRAVENOUS; SUBCUTANEOUS at 20:46

## 2021-08-16 NOTE — ED ADULT TRIAGE NOTE - CHIEF COMPLAINT QUOTE
Pt is approx 5 weeks pregnant sent by OB/GYN for possible ectopic shown via US.  Denies back pain, bag bleed

## 2021-08-16 NOTE — ED PROVIDER NOTE - ATTENDING CONTRIBUTION TO CARE
I (Manisha) agree with above, I performed a history and physical. Counseled mindy medical staff on medical decision making as documented, additionally and/or with the following exceptions: the patient presented with abd pain, found to have interstitial ectopic pregnancy, ob consulted and signed out to oncoming attending pending final ob recommendations.

## 2021-08-16 NOTE — ED PROVIDER NOTE - PATIENT PORTAL LINK FT
You can access the FollowMyHealth Patient Portal offered by [facility] by registering at the following website: [email or web address]. By joining [facility]’s FollowMyHealth portal, you will also be able to view your health information using other applications (apps) compatible with our system.

## 2021-08-16 NOTE — ED ADULT NURSE NOTE - OBJECTIVE STATEMENT
Pt awake, alert and oriented x 4 presents to ER after US at GYN 5 weeks gestation for rule out ectopic pregnancy.   Pt denies back pain, chest pain or SOB.   Denies pelvic pain or vaginal bleeding.   Denies n/v/d.    Denies headache, dizziness, pain/burning with urination.    Awaiting IV and blood work.   No acute distress.

## 2021-08-16 NOTE — ED PROVIDER NOTE - PROGRESS NOTE DETAILS
Chester Mathias MD. US showing an interstitial ectopic. had OB/GYN evaluate the patient. pt cleared for dc, to f/u with ATU (antepartum testing) within the next 1-2 days for additional testing. pt hd stable now. no abd pain. pt cleared for dc. strict return precautions provided.

## 2021-08-16 NOTE — ED PROVIDER NOTE - PHYSICAL EXAMINATION
PHYSICAL EXAM:  GENERAL: non-toxic appearing; in no respiratory distress  HEAD Atraumatic, Normocephalic  NECK: No JVD; FROM  EYES: PERRL, EOMs intact b/l w/out deficits; normal conjunctiva  CHEST/LUNG: CTAB no wheezes/rhonchi/rales  HEART: RRR no murmur/gallops/rubs  ABDOMEN: +BS, soft, NT, ND  EXTREMITIES: No LE edema, +2 radial pulses b/l, +2 DP/PT pulses b/l  MUSCULOSKELETAL: FROM of all 4 extremities  NERVOUS SYSTEM:  A&Ox3, No motor deficits or sensory deficits; CNII-XII intact; no focal neurologic deficits  SKIN:  No new rashes

## 2021-08-16 NOTE — ED PROVIDER NOTE - NSFOLLOWUPINSTRUCTIONS_ED_ALL_ED_FT
You were seen in the emergency department for concern for an ECTOPIC pregnancy.    On ultrasound, you were found to have something called an INTERSTITIAL ECTOPIC PREGNANCY.    Please call [phone] ([facility]'s Antepartum Unit) for further imaging. Please call first thing tomorrow morning to make an appointment and tell them that you were diagnosed with the interstitial ectopic pregnancy and that the OB/GYN doctors wanted you to call for more imaging.    Should you develop severe abdominal pain, pass out, having heavy vaginal bleeding, fever, severe vomiting, please return to the emergency department.

## 2021-08-16 NOTE — ED PROVIDER NOTE - CLINICAL SUMMARY MEDICAL DECISION MAKING FREE TEXT BOX
Chester Mathias MD. pt very well appearing, had clinic TVUS concerning for ectopic. pt HD stable. abd is soft, NT, ND. lower susipcion for torsion. will obtain labs, TVUS, UA, reassess

## 2021-08-16 NOTE — ED PROVIDER NOTE - NS ED ROS FT
Constitutional: no fevers; no chills  HEENT: no visual changes, no sore throat, no rhinorrhea  CV: no cp; no palpitations  Resp: no sob; no cough  GI: abd pain, no nausea, no vomiting, no diarrhea, no constipation  : no dysuria, no hematuria  MSK: no myalgais; no arthralgias  skin: no rashes  neuro: no HA, no numbness; no weakness, no tingling  ROS statement: all other ROS negative except as per HPI

## 2021-08-16 NOTE — ED PROVIDER NOTE - OBJECTIVE STATEMENT
37 yo F with no significant PMHx, approx 5 weeks pregnant (undesired pregnancy), LMP 21,  (2 C-sections), presents to the ED for possible ectopic pregnancy. [facility] and there, they performed a TVUS which was concerning for possible ectopic. She has lower abd discomfort but denies vaginal bleeding, dysuria, discharge, cp, sob, fever, nausea, vomiting, diarrhea

## 2021-08-16 NOTE — ED ADULT NURSE REASSESSMENT NOTE - NS ED NURSE REASSESS COMMENT FT1
Pt A&oX4, resting in stretcher. Pt denies cp, sob, abd pain, ha, dizziness, n.v.d, fevers/chills. Respirations even and unlabored, no accessory muscle use. 20g placed to LAC. Stretcher in lowest position, side rails up, call bell within reach. Pt going to US

## 2021-08-17 VITALS
OXYGEN SATURATION: 100 % | RESPIRATION RATE: 16 BRPM | DIASTOLIC BLOOD PRESSURE: 51 MMHG | SYSTOLIC BLOOD PRESSURE: 102 MMHG | TEMPERATURE: 98 F | HEART RATE: 72 BPM

## 2021-08-17 PROCEDURE — 99283 EMERGENCY DEPT VISIT LOW MDM: CPT

## 2021-08-17 NOTE — CONSULT NOTE ADULT - ATTENDING COMMENTS
Attending Note   Pt seen and examined   Pt was sent from [facility] for evaluation for ectopic pregnancy   TVUS reviewed with MFM   On exam soft, nontender  Discussed case with MFM Dr. Gómez who recommends f/u in ATU for evaluation of c/section scar ectopic vs interstitial pregnancy   Precautions reviewed   Rh +     R Joanna MAYORGA

## 2021-08-17 NOTE — CONSULT NOTE ADULT - SUBJECTIVE AND OBJECTIVE BOX
TYREE MARTINEZ  36y  Female [id number]    HPI:        Name of GYN Physician:   OBHx:    GynHx: Denies fibroids, cysts, endometriosis, STI's, Abnormal pap smears   PMH:  PSH:  Meds:  Social History:  Denies smoking use, drug use, alcohol use.   +occasional social alcohol use    Vital Signs Last 24 Hrs  T(C): 36.7 (17 Aug 2021 00:49), Max: 36.8 (16 Aug 2021 18:06)  T(F): 98 (17 Aug 2021 00:49), Max: 98.3 (16 Aug 2021 18:06)  HR: 72 (17 Aug 2021 00:49) (64 - 78)  BP: 102/51 (17 Aug 2021 00:49) (88/47 - 117/73)  BP(mean): --  RR: 16 (17 Aug 2021 00:49) (16 - 16)  SpO2: 100% (17 Aug 2021 00:49) (99% - 100%)    Physical Exam:   General: sitting comftorably in bed, NAD   HEENT: neck supple, full ROM  CV: RRR  Lungs: CTA b/l, good air flow b/l   Back: No CVA tenderness  Abd: Soft, non-tender, non-distended.  Bowel sounds present.    :  No bleeding on pad.    External labia wnl.  Bimanual exam with no cervical motion tenderness, uterus wnl, adnexa non palpable b/l.  Cervix closed vs. Cervix dilated  Speculum Exam: No active bleeding from os.  Physiologic discharge.    Ext: non-tender b/l, no edema     LABS:                              10.7   6.36  )-----------( 251      ( 16 Aug 2021 21:01 )             33.0     08-16    137  |  105  |  10  ----------------------------<  91  4.5   |  21<L>  |  0.60    Ca    8.9      16 Aug 2021 21:01    TPro  6.5  /  Alb  4.0  /  TBili  0.2  /  DBili  x   /  AST  17  /  ALT  14  /  AlkPhos  46  08-16    I&O's Detail          RADIOLOGY & ADDITIONAL STUDIES: TYREE MARTINEZ  36y  Female [id number]    HPI: Pt is a 37yo  LMP  [facility] [place] [facility]. Pt states she missed her period this week and [facility] as this was an unplanned and undesired pregnancy. Pt states she was sent in from the clinic when they visualized an ectopic pregnancy on TVUS. Pt had mild vaginal spotting last week that has now resolved. Denies any complaints, no abdominal pain, lightheadedness, vaginal bleeding, HA, SOB, CP, changes in bowel habits, dysuria.     Name of GYN Physician: Dr. Stephens ([facility], not affiliated w/ [facility])  OBHx: emergency C/S @34wks IUFD & ob hemorrhage ()--dec FM, no FHR visualized on U/S, pt w/ hemorrhage pt believes 2/2 placental abruption, FT C/Sx2  & ), medical eTOP x1   GynHx: Denies fibroids, cysts, endometriosis, STI's, Abnormal pap smears   PMH: denies  PSH: C/Sx3  Meds: multivitamins  Social History:  Denies smoking use, drug use, alcohol use.     Vital Signs Last 24 Hrs  T(C): 36.7 (17 Aug 2021 00:49), Max: 36.8 (16 Aug 2021 18:06)  T(F): 98 (17 Aug 2021 00:49), Max: 98.3 (16 Aug 2021 18:06)  HR: 72 (17 Aug 2021 00:49) (64 - 78)  BP: 102/51 (17 Aug 2021 00:49) (88/47 - 117/73)  BP(mean): --  RR: 16 (17 Aug 2021 00:49) (16 - 16)  SpO2: 100% (17 Aug 2021 00:49) (99% - 100%)    Physical Exam:   General: sitting comfortably in bed, NAD   HEENT: neck supple, full ROM  Lungs: nonlabored breathing on RA  Abd: Soft, non-tender, non-distended.  :  No bleeding on pad.       LABS:    [facility] (): [id number]                          10.7   6.36  )-----------( 251      ( 16 Aug 2021 21:01 )             33.0     08-16    137  |  105  |  10  ----------------------------<  91  4.5   |  21<L>  |  0.60    Ca    8.9      16 Aug 2021 21:01    TPro  6.5  /  Alb  4.0  /  TBili  0.2  /  DBili  x   /  AST  17  /  ALT  14  /  AlkPhos  46  08-16    I&O's Detail          RADIOLOGY & ADDITIONAL STUDIES: TYREE MARTINEZ  36y  Female [id number]    HPI: Pt is a 35yo  LMP  [facility] [place] [facility]. Pt states she missed her period this week and [facility] as this was an unplanned and undesired pregnancy. Pt states she was sent in from the clinic when they visualized an ectopic pregnancy on TVUS. Pt had mild vaginal spotting last week that has now resolved. Denies any complaints, no abdominal pain, lightheadedness, vaginal bleeding, HA, SOB, CP, changes in bowel habits, dysuria.     Name of GYN Physician: Dr. Stephens ([facility], not affiliated w/ [facility])  OBHx: emergency C/S @34wks IUFD & ob hemorrhage ()--dec FM, no FHR visualized on U/S, pt w/ hemorrhage pt believes 2/2 placental abruption, FT C/Sx2  & ), medical eTOP x1   GynHx: Denies fibroids, cysts, endometriosis, STI's, Abnormal pap smears   PMH: denies  PSH: C/Sx3  Meds: multivitamins  Social History:  Denies smoking use, drug use, alcohol use.     Vital Signs Last 24 Hrs  T(C): 36.7 (17 Aug 2021 00:49), Max: 36.8 (16 Aug 2021 18:06)  T(F): 98 (17 Aug 2021 00:49), Max: 98.3 (16 Aug 2021 18:06)  HR: 72 (17 Aug 2021 00:49) (64 - 78)  BP: 102/51 (17 Aug 2021 00:49) (88/47 - 117/73)  BP(mean): --  RR: 16 (17 Aug 2021 00:49) (16 - 16)  SpO2: 100% (17 Aug 2021 00:49) (99% - 100%)    Physical Exam:   General: sitting comfortably in bed, NAD   HEENT: neck supple, full ROM  Lungs: nonlabored breathing on RA  Abd: Soft, non-tender, non-distended.  :  No bleeding on pad.       LABS:    [facility] [place] (): [id number]                          10.7   6.36  )-----------( 251      ( 16 Aug 2021 21:01 )             33.0         137  |  105  |  10  ----------------------------<  91  4.5   |  21<L>  |  0.60    Ca    8.9      16 Aug 2021 21:01    TPro  6.5  /  Alb  4.0  /  TBili  0.2  /  DBili  x   /  AST  17  /  ALT  14  /  AlkPhos  46        RADIOLOGY & ADDITIONAL STUDIES:    < from: US Transvaginal (21 @ 21:56) >    EXAM:  US TRANSVAGINAL        PROCEDURE DATE:  Aug 16 2021         INTERPRETATION:  CLINICAL INFORMATION: Possible ectopic pregnancy on outside pelvic ultrasound. [id number].    LMP: 2021.      COMPARISON:None    TECHNIQUE: Endovaginal pelvic sonogram.    FINDINGS:    Uterus:  Single intrauterine gestation located eccentrically to the left with thin overlying myometrial mantle approximately 6 mm.    Gestational sac size (mean sac diameter): 1.3 cm  Estimated Gestational Age:6 weeks and1 day.  Yolk Sac: Normal.  Fetal pole: Not identified.    Right ovary: 3.0 x 1.4 x 3.3 cm. Within normal limits. Flow identified in the ovary.  Left ovary: 3.6 x 3.1 x 4.2 cm. Within normal limits. 2.0 x 1.9 x 2.3 cm corpus luteum. Flow identified inthe ovary.  Free fluid:None.    IMPRESSION:    Single intrauterine gestational sac containing a yolk sac but no fetal pole at this time. Estimated gestational age of 6 weeks 1 day. Gestational sac is located eccentrically to the left with thin overlying myometrial mantle. Findings raise suspicion for possible interstitial ectopic pregnancy. Close interval follow-up is advised and MRI is recommended for further evaluation.      These findings were discussed with Dr. MARKUS KUMAR [phone] at 2021 11:54 PM by Dr. Dickerson of Radiology with read back confirmation.    --- End of Report ---            MICHAEL DICKERSON MD; Resident Radiology  This document has been electronically signed.  MERRY GARCÍA MD; Attending Radiologist  This document has been electronically signed. Aug 17 2021 12:10AM    < end of copied text >

## 2021-08-17 NOTE — CONSULT NOTE ADULT - ASSESSMENT
Pt is a 37yo  LMP  [facility] [place] [facility]. Pt found to have interstitial pregnancy on TVUS performed in ED, [facility] [place] [address]. Differential diagnosis includes interstitial ectopic pregnancy vs IUP.    -pt clinically stable at this time  -consulted MFM who stated pt to f/u in ATU for further imaging within the next 48hrs prior to further management  -counseled pt regarding ectopic pregnancy, pt to return to ED w/ heavy vaginal bleeding or significant pain  -will email ATU to have them set up an appt & call pt    Jesse Quiñones,PGY2  D/w & seen w/ Dr. Marshall  D/w MFM Fellow Dr. Joaquin Gómez

## 2021-08-18 ENCOUNTER — APPOINTMENT (OUTPATIENT)
Dept: ANTEPARTUM | Facility: CLINIC | Age: 36
End: 2021-08-18
Payer: MEDICAID

## 2021-08-18 ENCOUNTER — ASOB RESULT (OUTPATIENT)
Age: 36
End: 2021-08-18

## 2021-08-18 PROCEDURE — 76801 OB US < 14 WKS SINGLE FETUS: CPT | Mod: 26

## 2023-05-05 NOTE — ED ADULT NURSE NOTE - CADM POA CENTRAL LINE
For sore throat discomfort, you can do salt water gargles, throat sprays or Cepacol lozenges, tea with honey.  Tylenol or ibuprofen can help with pain and/or fever as well. If you have a vaporizer, using that at night will help keep your throat from getting dry and more uncomfortable.    While it may be difficult at first to eat, try softer foods or liquids such as soup.  Popsicles can help temporarily with discomfort as well as get fluids down.  It is important to keep taking in fluids even if you do not feel like eating so that you stay well hydrated.    If your symptoms are not improving in next 2-3 days, please contact your primary care physician for re-evaluation.  Seek medical attention sooner if symptoms worsening.      Pharyngitis     BASIC INFORMATION  Description  Inflammation or infection of the pharynx. The pharynx is the hollow passage at the back of the throat. It is made up of the nasopharynx, which leads to the nose and oropharynx which leads to the mouth. The larynx (voice box) is located below the pharynx. Pharyngitis occurs in all age groups, but most often affects children.    Frequent Signs and Symptoms  Sore throat  Swallowing difficulty  Tickle or \"lump\" in the throat.  Fever  Swollen glands in the neck (sometimes)  Throat maybe red or covered with a white or grayish membrane (sometimes)  Body aches (sometimes)    Causes  Viral infection (most common cause) or bacterial infection (such as streptococcus). The germs are spread by person-to-person contact. Rarely, there may be other causes, such as irritation.    Risk increases with  Common cold, flu, or seasonal allergies.  Weak immune system due to illness or drugs.  Smoking or second-hand smoke.  Chronic illness, such as diabetes.  Close quarters, such as with  reccruits, in schools, and  centers.    Preventive Measures  Avoid close contact with anyone with a sore throat.  Avoid germs. Wash hands often, especially  children.    Expected Outcomes  Most cases of viral infection clear up on their own in a week. Antibiotic drugs can successfully treat bacterial infections. Complications are rare.    Possible Complications  Airways may become blocked.  Abscess (pus-filled area of infection)   Rheumatic fever, scarlet fever, or glomerulonephritis, if pharyngitis is caused by streptococcal bacteria and does not receive adequate antibiotic treatment.     DIAGNOSIS & TREATMENT  General Measures  Your health care provider will do an exam of the throat, ears, nose, neck, and lungs. Medical tests may include blood study and throat culture (from a swab of the throat), or rapid strep test find the type of infection.   Treatment will include self-care measures and antibiotic drugs for bacterial infections. Antibiotics will not help viral infections.  To relieve the sore throat, gargle frequently with warm or cold double-strength tea or warm salt water (mix on-half teaspoon of salt in one cup of water).   Wash hands often to help prevent the spread of germs to other family members. Avoid kissing or sharing cups or other utensils.     Medications  For minor discomfort, you may use nonprescription drugs such as ibuprofen. Don't give aspirin to a child.   Nonprescription throat lozenges (for patients over age 3) may help ease discomfort.  Antibiotic drugs are usually prescribed for bacterial infection, such as for strep throat. Finish entire course of drugs even if symptoms improve.    Activity  Return to normal activities as symptoms improve. A person can no longer spread the germs if they have taken antibiotic drug for at least 24 hours.    Diet  Drink plenty of fluids. If swallowing solid food is painful, try a liquid or soft diet for a few days.    NOTIFY YOUR PRIMARY CARE PHYSICIAN IF  You or a family member has symptoms of pharyngitis  The following occur during treatment  Breathing/swallowing difficulty or chest pain.  Fever worsens or  severe headache develops.  Thick mucus drainage from the nose.  Cough that produces colored or bloody sputum.  Skin rash.  Dark urine.     Copyright © 2005 by [employer]. All rights reserved    Honey lemon tea, gargles, ibuprofen, elevate head of bed, cepacol lozenges.       What is a sore throat?   A sore throat (pharyngitis) is the pain you feel when your throat becomes irritated and/or inflamed.   What causes a sore throat?   The most common cause of a sore throat is a viral infection, such as a cold. Other causes include allergies, postnasal drip, or cigarette smoke. Sometimes a sore throat is caused by bacteria (strep throat).   Viruses and bacteria are passed from an infected person’s saliva or nose secretions during coughing or sneezing. You can also  viruses or bacteria from objects an infected person touched and transfer them to your nose or mouth.   How is a sore throat diagnosed?   Antibiotics only help for infections caused by bacteria, so it is important to test for strep. A rapid test can quickly diagnose strep throat during a clinic visit. If the rapid test is negative, a throat swab is sent to an outside laboratory to confirm whether or not strep bacteria are present.     How is a sore throat treated?   Viruses DO NOT respond to antibiotics--only bacterial infections do. To reduce the discomfort from a viral infection or other causes of a sore throat, you may try the following:   Get plenty of sleep. Rest helps your body fight off viruses.   Keep your sore throat moist. Ways to do this include drinking plenty of fluids (a soda-can size of fluid every hour while awake), gargling several times a day with warm salt water (1 teaspoon of table salt in 8 ounces of warm water), using a humidifier, or sucking on sugar-free popsicles or hard candies. This will help reduce pain and make swallowing easier.   Consider using an over-the-counter pain reliever. Acetaminophen (e.g., Tylenol®) or ibuprofen  (e.g., Advil®) are safe when used as directed. Throat lozenges or throat sprays (e.g., Chloraseptic®) may also help with throat pain.   Avoid irritating the throat. Eat soothing foods such broth, applesauce, cooked cereal, mashed potatoes, yogurt, soft-cooked eggs, pureed foods, or sherbet. Avoid spicy or acidic foods such as orange juice.   Don’t smoke. Avoid being around people who are smoking.     How can I keep from getting a sore throat?   The best way to prevent a sore throat is to reduce your risk of getting the virus. This can be done by:   Frequently washing your hands, especially before eating.   Avoid sharing cups, eating utensils, and toothbrushes.   Limiting close contact with people who have symptoms of a sore throat or have been diagnosed with strep throat.     What should I do if my sore throat gets worse?   Seek medical care immediately by calling 911 or going to the nearest emergency room if you develop the following:   Problems breathing because your throat is so swollen   You cannot swallow fluids or your saliva is drooling from your mouth   You cannot open your mouth     Follow up with your primary care provider if your symptoms get worse, you are not starting to feel better in 3-5 days, or your temperature remains above 101° F while you are taking fever-reducing medicine.       No

## 2023-12-08 NOTE — ED PROVIDER NOTE - NSCAREINITIATED _GEN_ER
Chester Mathias(Resident) Drysol Pregnancy And Lactation Text: This medication is considered safe during pregnancy and breast feeding. Topical Clindamycin Pregnancy And Lactation Text: This medication is Pregnancy Category B and is considered safe during pregnancy. It is unknown if it is excreted in breast milk. Zyclara Counseling:  I discussed with the patient the risks of imiquimod including but not limited to erythema, scaling, itching, weeping, crusting, and pain.  Patient understands that the inflammatory response to imiquimod is variable from person to person and was educated regarded proper titration schedule.  If flu-like symptoms develop, patient knows to discontinue the medication and contact us. Benzoyl Peroxide Counseling: Patient counseled that medicine may cause skin irritation and bleach clothing.  In the event of skin irritation, the patient was advised to reduce the amount of the drug applied or use it less frequently.   The patient verbalized understanding of the proper use and possible adverse effects of benzoyl peroxide.  All of the patient's questions and concerns were addressed. Niacinamide Counseling: I recommended taking niacin or niacinamide, also know as vitamin B3, twice daily. Recent evidence suggests that taking vitamin B3 (500 mg twice daily) can reduce the risk of actinic keratoses and non-melanoma skin cancers. Side effects of vitamin B3 include flushing and headache. Ivermectin Pregnancy And Lactation Text: This medication is Pregnancy Category C and it isn't known if it is safe during pregnancy. It is also excreted in breast milk. Quinolones Pregnancy And Lactation Text: This medication is Pregnancy Category C and it isn't know if it is safe during pregnancy. It is also excreted in breast milk. Methotrexate Pregnancy And Lactation Text: This medication is Pregnancy Category X and is known to cause fetal harm. This medication is excreted in breast milk. Doxycycline Counseling:  Patient counseled regarding possible photosensitivity and increased risk for sunburn.  Patient instructed to avoid sunlight, if possible.  When exposed to sunlight, patients should wear protective clothing, sunglasses, and sunscreen.  The patient was instructed to call the office immediately if the following severe adverse effects occur:  hearing changes, easy bruising/bleeding, severe headache, or vision changes.  The patient verbalized understanding of the proper use and possible adverse effects of doxycycline.  All of the patient's questions and concerns were addressed. Dapsone Pregnancy And Lactation Text: This medication is Pregnancy Category C and is not considered safe during pregnancy or breast feeding. Enbrel Counseling:  I discussed with the patient the risks of etanercept including but not limited to myelosuppression, immunosuppression, autoimmune hepatitis, demyelinating diseases, lymphoma, and infections.  The patient understands that monitoring is required including a PPD at baseline and must alert us or the primary physician if symptoms of infection or other concerning signs are noted. Rhofade Counseling: Rhofade is a topical medication which can decrease superficial blood flow where applied. Side effects are uncommon and include stinging, redness and allergic reactions. Cimetidine Pregnancy And Lactation Text: This medication is Pregnancy Category B and is considered safe during pregnancy. It is also excreted in breast milk and breast feeding isn't recommended. Griseofulvin Counseling:  I discussed with the patient the risks of griseofulvin including but not limited to photosensitivity, cytopenia, liver damage, nausea/vomiting and severe allergy.  The patient understands that this medication is best absorbed when taken with a fatty meal (e.g., ice cream or french fries). Azathioprine Counseling:  I discussed with the patient the risks of azathioprine including but not limited to myelosuppression, immunosuppression, hepatotoxicity, lymphoma, and infections.  The patient understands that monitoring is required including baseline LFTs, Creatinine, possible TPMP genotyping and weekly CBCs for the first month and then every 2 weeks thereafter.  The patient verbalized understanding of the proper use and possible adverse effects of azathioprine.  All of the patient's questions and concerns were addressed. Tremfya Pregnancy And Lactation Text: The risk during pregnancy and breastfeeding is uncertain with this medication. Minoxidil Pregnancy And Lactation Text: This medication has not been assigned a Pregnancy Risk Category but animal studies failed to show danger with the topical medication. It is unknown if the medication is excreted in breast milk. Tranexamic Acid Pregnancy And Lactation Text: It is unknown if this medication is safe during pregnancy or breast feeding. Sotyktu Counseling:  I discussed the most common side effects of Sotyktu including: common cold, sore throat, sinus infections, cold sores, canker sores, folliculitis, and acne.  I also discussed more serious side effects of Sotyktu including but not limited to: serious allergic reactions; increased risk for infections such as TB; cancers such as lymphomas; rhabdomyolysis and elevated CPK; and elevated triglycerides and liver enzymes.  Simponi Counseling:  I discussed with the patient the risks of golimumab including but not limited to myelosuppression, immunosuppression, autoimmune hepatitis, demyelinating diseases, lymphoma, and serious infections.  The patient understands that monitoring is required including a PPD at baseline and must alert us or the primary physician if symptoms of infection or other concerning signs are noted. Zyclara Pregnancy And Lactation Text: This medication is Pregnancy Category C. It is unknown if this medication is excreted in breast milk. Oxybutynin Counseling:  I discussed with the patient the risks of oxybutynin including but not limited to skin rash, drowsiness, dry mouth, difficulty urinating, and blurred vision. Elidel Counseling: Patient may experience a mild burning sensation during topical application. Elidel is not approved in children less than 2 years of age. There have been case reports of hematologic and skin malignancies in patients using topical calcineurin inhibitors although causality is questionable. Niacinamide Pregnancy And Lactation Text: These medications are considered safe during pregnancy. Humira Pregnancy And Lactation Text: This medication is Pregnancy Category B and is considered safe during pregnancy. It is unknown if this medication is excreted in breast milk. Benzoyl Peroxide Pregnancy And Lactation Text: This medication is Pregnancy Category C. It is unknown if benzoyl peroxide is excreted in breast milk. Rhofade Pregnancy And Lactation Text: This medication has not been assigned a Pregnancy Risk Category. It is unknown if the medication is excreted in breast milk. Topical Ketoconazole Counseling: Patient counseled that this medication may cause skin irritation or allergic reactions.  In the event of skin irritation, the patient was advised to reduce the amount of the drug applied or use it less frequently.   The patient verbalized understanding of the proper use and possible adverse effects of ketoconazole.  All of the patient's questions and concerns were addressed. Rifampin Counseling: I discussed with the patient the risks of rifampin including but not limited to liver damage, kidney damage, red-orange body fluids, nausea/vomiting and severe allergy. Gabapentin Counseling: I discussed with the patient the risks of gabapentin including but not limited to dizziness, somnolence, fatigue and ataxia. Dutasteride Counseling: Dustasteride Counseling:  I discussed with the patient the risks of use of dutasteride including but not limited to decreased libido, decreased ejaculate volume, and gynecomastia. Women who can become pregnant should not handle medication.  All of the patient's questions and concerns were addressed. Xolair Counseling:  Patient informed of potential adverse effects including but not limited to fever, muscle aches, rash and allergic reactions.  The patient verbalized understanding of the proper use and possible adverse effects of Xolair.  All of the patient's questions and concerns were addressed. Doxycycline Pregnancy And Lactation Text: This medication is Pregnancy Category D and not consider safe during pregnancy. It is also excreted in breast milk but is considered safe for shorter treatment courses. Prednisone Counseling:  I discussed with the patient the risks of prolonged use of prednisone including but not limited to weight gain, insomnia, osteoporosis, mood changes, diabetes, susceptibility to infection, glaucoma and high blood pressure.  In cases where prednisone use is prolonged, patients should be monitored with blood pressure checks, serum glucose levels and an eye exam.  Additionally, the patient may need to be placed on GI prophylaxis, PCP prophylaxis, and calcium and vitamin D supplementation and/or a bisphosphonate.  The patient verbalized understanding of the proper use and the possible adverse effects of prednisone.  All of the patient's questions and concerns were addressed. Azithromycin Counseling:  I discussed with the patient the risks of azithromycin including but not limited to GI upset, allergic reaction, drug rash, diarrhea, and yeast infections. Azathioprine Pregnancy And Lactation Text: This medication is Pregnancy Category D and isn't considered safe during pregnancy. It is unknown if this medication is excreted in breast milk. Adbry Counseling: I discussed with the patient the risks of tralokinumab including but not limited to eye infection and irritation, cold sores, injection site reactions, worsening of asthma, allergic reactions and increased risk of parasitic infection.  Live vaccines should be avoided while taking tralokinumab. The patient understands that monitoring is required and they must alert us or the primary physician if symptoms of infection or other concerning signs are noted. Griseofulvin Pregnancy And Lactation Text: This medication is Pregnancy Category X and is known to cause serious birth defects. It is unknown if this medication is excreted in breast milk but breast feeding should be avoided. Valtrex Counseling: I discussed with the patient the risks of valacyclovir including but not limited to kidney damage, nausea, vomiting and severe allergy.  The patient understands that if the infection seems to be worsening or is not improving, they are to call. Bexarotene Pregnancy And Lactation Text: This medication is Pregnancy Category X and should not be given to women who are pregnant or may become pregnant. This medication should not be used if you are breast feeding. Doxepin Counseling:  Patient advised that the medication is sedating and not to drive a car after taking this medication. Patient informed of potential adverse effects including but not limited to dry mouth, urinary retention, and blurry vision.  The patient verbalized understanding of the proper use and possible adverse effects of doxepin.  All of the patient's questions and concerns were addressed. Mirvaso Counseling: Mirvaso is a topical medication which can decrease superficial blood flow where applied. Side effects are uncommon and include stinging, redness and allergic reactions. Sotyktu Pregnancy And Lactation Text: There is insufficient data to evaluate whether or not Sotyktu is safe to use during pregnancy.   It is not known if Sotyktu passes into breast milk and whether or not it is safe to use when breastfeeding.   Carac Counseling:  I discussed with the patient the risks of Carac including but not limited to erythema, scaling, itching, weeping, crusting, and pain. Ilumya Counseling: I discussed with the patient the risks of tildrakizumab including but not limited to immunosuppression, malignancy, posterior leukoencephalopathy syndrome, and serious infections.  The patient understands that monitoring is required including a PPD at baseline and must alert us or the primary physician if symptoms of infection or other concerning signs are noted. Rifampin Pregnancy And Lactation Text: This medication is Pregnancy Category C and it isn't know if it is safe during pregnancy. It is also excreted in breast milk and should not be used if you are breast feeding. Nsaids Counseling: NSAID Counseling: I discussed with the patient that NSAIDs should be taken with food. Prolonged use of NSAIDs can result in the development of stomach ulcers.  Patient advised to stop taking NSAIDs if abdominal pain occurs.  The patient verbalized understanding of the proper use and possible adverse effects of NSAIDs.  All of the patient's questions and concerns were addressed. Opioid Counseling: I discussed with the patient the potential side effects of opioids including but not limited to addiction, altered mental status, and depression. I stressed avoiding alcohol, benzodiazepines, muscle relaxants and sleep aids unless specifically okayed by a physician. The patient verbalized understanding of the proper use and possible adverse effects of opioids. All of the patient's questions and concerns were addressed. They were instructed to flush the remaining pills down the toilet if they did not need them for pain. Gabapentin Pregnancy And Lactation Text: This medication is Pregnancy Category C and isn't considered safe during pregnancy. It is excreted in breast milk. Prednisone Pregnancy And Lactation Text: This medication is Pregnancy Category C and it isn't know if it is safe during pregnancy. This medication is excreted in breast milk. Dutasteride Pregnancy And Lactation Text: This medication is absolutely contraindicated in women, especially during pregnancy and breast feeding. Feminization of male fetuses is possible if taking while pregnant. Itraconazole Counseling:  I discussed with the patient the risks of itraconazole including but not limited to liver damage, nausea/vomiting, neuropathy, and severe allergy.  The patient understands that this medication is best absorbed when taken with acidic beverages such as non-diet cola or ginger ale.  The patient understands that monitoring is required including baseline LFTs and repeat LFTs at intervals.  The patient understands that they are to contact us or the primary physician if concerning signs are noted. Solaraze Counseling:  I discussed with the patient the risks of Solaraze including but not limited to erythema, scaling, itching, weeping, crusting, and pain. Erythromycin Counseling:  I discussed with the patient the risks of erythromycin including but not limited to GI upset, allergic reaction, drug rash, diarrhea, increase in liver enzymes, and yeast infections. Xeljanz Counseling: I discussed with the patient the risks of Xeljanz therapy including increased risk of infection, liver issues, headache, diarrhea, or cold symptoms. Live vaccines should be avoided. They were instructed to call if they have any problems. Xolair Pregnancy And Lactation Text: This medication is Pregnancy Category B and is considered safe during pregnancy. This medication is excreted in breast milk. Cellcept Counseling:  I discussed with the patient the risks of mycophenolate mofetil including but not limited to infection/immunosuppression, GI upset, hypokalemia, hypercholesterolemia, bone marrow suppression, lymphoproliferative disorders, malignancy, GI ulceration/bleed/perforation, colitis, interstitial lung disease, kidney failure, progressive multifocal leukoencephalopathy, and birth defects.  The patient understands that monitoring is required including a baseline creatinine and regular CBC testing. In addition, patient must alert us immediately if symptoms of infection or other concerning signs are noted. Azithromycin Pregnancy And Lactation Text: This medication is considered safe during pregnancy and is also secreted in breast milk. Valtrex Pregnancy And Lactation Text: this medication is Pregnancy Category B and is considered safe during pregnancy. This medication is not directly found in breast milk but it's metabolite acyclovir is present. Adbry Pregnancy And Lactation Text: It is unknown if this medication will adversely affect pregnancy or breast feeding. Skyrizi Counseling: I discussed with the patient the risks of risankizumab-rzaa including but not limited to immunosuppression, and serious infections.  The patient understands that monitoring is required including a PPD at baseline and must alert us or the primary physician if symptoms of infection or other concerning signs are noted. Topical Sulfur Applications Counseling: Topical Sulfur Counseling: Patient counseled that this medication may cause skin irritation or allergic reactions.  In the event of skin irritation, the patient was advised to reduce the amount of the drug applied or use it less frequently.   The patient verbalized understanding of the proper use and possible adverse effects of topical sulfur application.  All of the patient's questions and concerns were addressed. Eucrisa Counseling: Patient may experience a mild burning sensation during topical application. Eucrisa is not approved in children less than 2 years of age. Propranolol Counseling:  I discussed with the patient the risks of propranolol including but not limited to low heart rate, low blood pressure, low blood sugar, restlessness and increased cold sensitivity. They should call the office if they experience any of these side effects. Doxepin Pregnancy And Lactation Text: This medication is Pregnancy Category C and it isn't known if it is safe during pregnancy. It is also excreted in breast milk and breast feeding isn't recommended. Carac Pregnancy And Lactation Text: This medication is Pregnancy Category X and contraindicated in pregnancy and in women who may become pregnant. It is unknown if this medication is excreted in breast milk. Isotretinoin Counseling: Patient should get monthly blood tests, not donate blood, not drive at night if vision affected, not share medication, and not undergo elective surgery for 6 months after tx completed. Side effects reviewed, pt to contact office should one occur. Nsaids Pregnancy And Lactation Text: These medications are considered safe up to 30 weeks gestation. It is excreted in breast milk. Sarecycline Counseling: Patient advised regarding possible photosensitivity and discoloration of the teeth, skin, lips, tongue and gums.  Patient instructed to avoid sunlight, if possible.  When exposed to sunlight, patients should wear protective clothing, sunglasses, and sunscreen.  The patient was instructed to call the office immediately if the following severe adverse effects occur:  hearing changes, easy bruising/bleeding, severe headache, or vision changes.  The patient verbalized understanding of the proper use and possible adverse effects of sarecycline.  All of the patient's questions and concerns were addressed. Opioid Pregnancy And Lactation Text: These medications can lead to premature delivery and should be avoided during pregnancy. These medications are also present in breast milk in small amounts. Glycopyrrolate Counseling:  I discussed with the patient the risks of glycopyrrolate including but not limited to skin rash, drowsiness, dry mouth, difficulty urinating, and blurred vision. Erythromycin Pregnancy And Lactation Text: This medication is Pregnancy Category B and is considered safe during pregnancy. It is also excreted in breast milk. Cimzia Counseling:  I discussed with the patient the risks of Cimzia including but not limited to immunosuppression, allergic reactions and infections.  The patient understands that monitoring is required including a PPD at baseline and must alert us or the primary physician if symptoms of infection or other concerning signs are noted. Finasteride Counseling:  I discussed with the patient the risks of use of finasteride including but not limited to decreased libido, decreased ejaculate volume, gynecomastia, and depression. Women should not handle medication.  All of the patient's questions and concerns were addressed. Xelkvngz Pregnancy And Lactation Text: This medication is Pregnancy Category D and is not considered safe during pregnancy.  The risk during breast feeding is also uncertain. Opzelura Counseling:  I discussed with the patient the risks of Opzelura including but not limited to nasopharngitis, bronchitis, ear infection, eosinophila, hives, diarrhea, folliculitis, tonsillitis, and rhinorrhea.  Taken orally, this medication has been linked to serious infections; higher rate of mortality; malignancy and lymphoproliferative disorders; major adverse cardiovascular events; thrombosis; thrombocytopenia, anemia, and neutropenia; and lipid elevations. Solaraze Pregnancy And Lactation Text: This medication is Pregnancy Category B and is considered safe. There is some data to suggest avoiding during the third trimester. It is unknown if this medication is excreted in breast milk. Bactrim Counseling:  I discussed with the patient the risks of sulfa antibiotics including but not limited to GI upset, allergic reaction, drug rash, diarrhea, dizziness, photosensitivity, and yeast infections.  Rarely, more serious reactions can occur including but not limited to aplastic anemia, agranulocytosis, methemoglobinemia, blood dyscrasias, liver or kidney failure, lung infiltrates or desquamative/blistering drug rashes. Cibinqo Counseling: I discussed with the patient the risks of Cibinqo therapy including but not limited to common cold, nausea, headache, cold sores, increased blood CPK levels, dizziness, UTIs, fatigue, acne, and vomitting. Live vaccines should be avoided.  This medication has been linked to serious infections; higher rate of mortality; malignancy and lymphoproliferative disorders; major adverse cardiovascular events; thrombosis; thrombocytopenia and lymphopenia; lipid elevations; and retinal detachment. Detail Level: Detailed Isotretinoin Pregnancy And Lactation Text: This medication is Pregnancy Category X and is considered extremely dangerous during pregnancy. It is unknown if it is excreted in breast milk. Propranolol Pregnancy And Lactation Text: This medication is Pregnancy Category C and it isn't known if it is safe during pregnancy. It is excreted in breast milk. Hydroxyzine Counseling: Patient advised that the medication is sedating and not to drive a car after taking this medication.  Patient informed of potential adverse effects including but not limited to dry mouth, urinary retention, and blurry vision.  The patient verbalized understanding of the proper use and possible adverse effects of hydroxyzine.  All of the patient's questions and concerns were addressed. Olanzapine Counseling- I discussed with the patient the common side effects of olanzapine including but are not limited to: lack of energy, dry mouth, increased appetite, sleepiness, tremor, constipation, dizziness, changes in behavior, or restlessness.  Explained that teenagers are more likely to experience headaches, abdominal pain, pain in the arms or legs, tiredness, and sleepiness.  Serious side effects include but are not limited: increased risk of death in elderly patients who are confused, have memory loss, or dementia-related psychosis; hyperglycemia; increased cholesterol and triglycerides; and weight gain. Erivedge Counseling- I discussed with the patient the risks of Erivedge including but not limited to nausea, vomiting, diarrhea, constipation, weight loss, changes in the sense of taste, decreased appetite, muscle spasms, and hair loss.  The patient verbalized understanding of the proper use and possible adverse effects of Erivedge.  All of the patient's questions and concerns were addressed. Infliximab Counseling:  I discussed with the patient the risks of infliximab including but not limited to myelosuppression, immunosuppression, autoimmune hepatitis, demyelinating diseases, lymphoma, and serious infections.  The patient understands that monitoring is required including a PPD at baseline and must alert us or the primary physician if symptoms of infection or other concerning signs are noted. Topical Sulfur Applications Pregnancy And Lactation Text: This medication is Pregnancy Category C and has an unknown safety profile during pregnancy. It is unknown if this topical medication is excreted in breast milk. Calcipotriene Counseling:  I discussed with the patient the risks of calcipotriene including but not limited to erythema, scaling, itching, and irritation. Use Enhanced Medication Counseling?: No Sarecycline Pregnancy And Lactation Text: This medication is Pregnancy Category D and not consider safe during pregnancy. It is also excreted in breast milk. Topical Retinoid counseling:  Patient advised to apply a pea-sized amount only at bedtime and wait 30 minutes after washing their face before applying.  If too drying, patient may add a non-comedogenic moisturizer. The patient verbalized understanding of the proper use and possible adverse effects of retinoids.  All of the patient's questions and concerns were addressed. Ketoconazole Counseling:   Patient counseled regarding improving absorption with orange juice.  Adverse effects include but are not limited to breast enlargement, headache, diarrhea, nausea, upset stomach, liver function test abnormalities, taste disturbance, and stomach pain.  There is a rare possibility of liver failure that can occur when taking ketoconazole. The patient understands that monitoring of LFTs may be required, especially at baseline. The patient verbalized understanding of the proper use and possible adverse effects of ketoconazole.  All of the patient's questions and concerns were addressed. Finasteride Pregnancy And Lactation Text: This medication is absolutely contraindicated during pregnancy. It is unknown if it is excreted in breast milk. Metronidazole Counseling:  I discussed with the patient the risks of metronidazole including but not limited to seizures, nausea/vomiting, a metallic taste in the mouth, nausea/vomiting and severe allergy. Glycopyrrolate Pregnancy And Lactation Text: This medication is Pregnancy Category B and is considered safe during pregnancy. It is unknown if it is excreted breast milk. Hydroxyzine Pregnancy And Lactation Text: This medication is not safe during pregnancy and should not be taken. It is also excreted in breast milk and breast feeding isn't recommended. Cyclophosphamide Counseling:  I discussed with the patient the risks of cyclophosphamide including but not limited to hair loss, hormonal abnormalities, decreased fertility, abdominal pain, diarrhea, nausea and vomiting, bone marrow suppression and infection. The patient understands that monitoring is required while taking this medication. Opzelura Pregnancy And Lactation Text: There is insufficient data to evaluate drug-associated risk for major birth defects, miscarriage, or other adverse maternal or fetal outcomes.  There is a pregnancy registry that monitors pregnancy outcomes in pregnant persons exposed to the medication during pregnancy.  It is unknown if this medication is excreted in breast milk.  Do not breastfeed during treatment and for about 4 weeks after the last dose. Cimzia Pregnancy And Lactation Text: This medication crosses the placenta but can be considered safe in certain situations. Cimzia may be excreted in breast milk. Stelara Counseling:  I discussed with the patient the risks of ustekinumab including but not limited to immunosuppression, malignancy, posterior leukoencephalopathy syndrome, and serious infections.  The patient understands that monitoring is required including a PPD at baseline and must alert us or the primary physician if symptoms of infection or other concerning signs are noted. SSKI Counseling:  I discussed with the patient the risks of SSKI including but not limited to thyroid abnormalities, metallic taste, GI upset, fever, headache, acne, arthralgias, paraesthesias, lymphadenopathy, easy bleeding, arrhythmias, and allergic reaction. Hydroquinone Counseling:  Patient advised that medication may result in skin irritation, lightening (hypopigmentation), dryness, and burning.  In the event of skin irritation, the patient was advised to reduce the amount of the drug applied or use it less frequently.  Rarely, spots that are treated with hydroquinone can become darker (pseudoochronosis).  Should this occur, patient instructed to stop medication and call the office. The patient verbalized understanding of the proper use and possible adverse effects of hydroquinone.  All of the patient's questions and concerns were addressed. Bactrim Pregnancy And Lactation Text: This medication is Pregnancy Category D and is known to cause fetal risk.  It is also excreted in breast milk. Arava Counseling:  Patient counseled regarding adverse effects of Arava including but not limited to nausea, vomiting, abnormalities in liver function tests. Patients may develop mouth sores, rash, diarrhea, and abnormalities in blood counts. The patient understands that monitoring is required including LFTs and blood counts.  There is a rare possibility of scarring of the liver and lung problems that can occur when taking methotrexate. Persistent nausea, loss of appetite, pale stools, dark urine, cough, and shortness of breath should be reported immediately. Patient advised to discontinue Arava treatment and consult with a physician prior to attempting conception. The patient will have to undergo a treatment to eliminate Arava from the body prior to conception. High Dose Vitamin A Counseling: Side effects reviewed, pt to contact office should one occur. Cibinqo Pregnancy And Lactation Text: It is unknown if this medication will adversely affect pregnancy or breast feeding.  You should not take this medication if you are currently pregnant or planning a pregnancy or while breastfeeding. Erivedge Pregnancy And Lactation Text: This medication is Pregnancy Category X and is absolutely contraindicated during pregnancy. It is unknown if it is excreted in breast milk. Olanzapine Pregnancy And Lactation Text: This medication is pregnancy category C.   There are no adequate and well controlled trials with olanzapine in pregnant females.  Olanzapine should be used during pregnancy only if the potential benefit justifies the potential risk to the fetus.   In a study in lactating healthy women, olanzapine was excreted in breast milk.  It is recommended that women taking olanzapine should not breast feed. Calcipotriene Pregnancy And Lactation Text: This medication has not been proven safe during pregnancy. It is unknown if this medication is excreted in breast milk. Birth Control Pills Counseling: Birth Control Pill Counseling: I discussed with the patient the potential side effects of OCPs including but not limited to increased risk of stroke, heart attack, thrombophlebitis, deep venous thrombosis, hepatic adenomas, breast changes, GI upset, headaches, and depression.  The patient verbalized understanding of the proper use and possible adverse effects of OCPs. All of the patient's questions and concerns were addressed. Wartpeel Counseling:  I discussed with the patient the risks of Wartpeel including but not limited to erythema, scaling, itching, weeping, crusting, and pain. Tetracycline Counseling: Patient counseled regarding possible photosensitivity and increased risk for sunburn.  Patient instructed to avoid sunlight, if possible.  When exposed to sunlight, patients should wear protective clothing, sunglasses, and sunscreen.  The patient was instructed to call the office immediately if the following severe adverse effects occur:  hearing changes, easy bruising/bleeding, severe headache, or vision changes.  The patient verbalized understanding of the proper use and possible adverse effects of tetracycline.  All of the patient's questions and concerns were addressed. Patient understands to avoid pregnancy while on therapy due to potential birth defects. Hydroxychloroquine Counseling:  I discussed with the patient that a baseline ophthalmologic exam is needed at the start of therapy and every year thereafter while on therapy. A CBC may also be warranted for monitoring.  The side effects of this medication were discussed with the patient, including but not limited to agranulocytosis, aplastic anemia, seizures, rashes, retinopathy, and liver toxicity. Patient instructed to call the office should any adverse effect occur.  The patient verbalized understanding of the proper use and possible adverse effects of Plaquenil.  All the patient's questions and concerns were addressed. Picato Counseling:  I discussed with the patient the risks of Picato including but not limited to erythema, scaling, itching, weeping, crusting, and pain. Ketoconazole Pregnancy And Lactation Text: This medication is Pregnancy Category C and it isn't know if it is safe during pregnancy. It is also excreted in breast milk and breast feeding isn't recommended. Metronidazole Pregnancy And Lactation Text: This medication is Pregnancy Category B and considered safe during pregnancy.  It is also excreted in breast milk. Cephalexin Counseling: I counseled the patient regarding use of cephalexin as an antibiotic for prophylactic and/or therapeutic purposes. Cephalexin (commonly prescribed under brand name Keflex) is a cephalosporin antibiotic which is active against numerous classes of bacteria, including most skin bacteria. Side effects may include nausea, diarrhea, gastrointestinal upset, rash, hives, yeast infections, and in rare cases, hepatitis, kidney disease, seizures, fever, confusion, neurologic symptoms, and others. Patients with severe allergies to penicillin medications are cautioned that there is about a 10% incidence of cross-reactivity with cephalosporins. When possible, patients with penicillin allergies should use alternatives to cephalosporins for antibiotic therapy. Cyclophosphamide Pregnancy And Lactation Text: This medication is Pregnancy Category D and it isn't considered safe during pregnancy. This medication is excreted in breast milk. Cosentyx Counseling:  I discussed with the patient the risks of Cosentyx including but not limited to worsening of Crohn's disease, immunosuppression, allergic reactions and infections.  The patient understands that monitoring is required including a PPD at baseline and must alert us or the primary physician if symptoms of infection or other concerning signs are noted. High Dose Vitamin A Pregnancy And Lactation Text: High dose vitamin A therapy is contraindicated during pregnancy and breast feeding. Acitretin Counseling:  I discussed with the patient the risks of acitretin including but not limited to hair loss, dry lips/skin/eyes, liver damage, hyperlipidemia, depression/suicidal ideation, photosensitivity.  Serious rare side effects can include but are not limited to pancreatitis, pseudotumor cerebri, bony changes, clot formation/stroke/heart attack.  Patient understands that alcohol is contraindicated since it can result in liver toxicity and significantly prolong the elimination of the drug by many years. Olumiant Counseling: I discussed with the patient the risks of Olumiant therapy including but not limited to upper respiratory tract infections, shingles, cold sores, and nausea. Live vaccines should be avoided.  This medication has been linked to serious infections; higher rate of mortality; malignancy and lymphoproliferative disorders; major adverse cardiovascular events; thrombosis; gastrointestinal perforations; neutropenia; lymphopenia; anemia; liver enzyme elevations; and lipid elevations. Sski Pregnancy And Lactation Text: This medication is Pregnancy Category D and isn't considered safe during pregnancy. It is excreted in breast milk. 5-Fu Counseling: 5-Fluorouracil Counseling:  I discussed with the patient the risks of 5-fluorouracil including but not limited to erythema, scaling, itching, weeping, crusting, and pain. Libtayo Counseling- I discussed with the patient the risks of Libtayo including but not limited to nausea, vomiting, diarrhea, and bone or muscle pain.  The patient verbalized understanding of the proper use and possible adverse effects of Libtayo.  All of the patient's questions and concerns were addressed. Rituxan Counseling:  I discussed with the patient the risks of Rituxan infusions. Side effects can include infusion reactions, severe drug rashes including mucocutaneous reactions, reactivation of latent hepatitis and other infections and rarely progressive multifocal leukoencephalopathy.  All of the patient's questions and concerns were addressed. Oral Minoxidil Counseling- I discussed with the patient the risks of oral minoxidil including but not limited to shortness of breath, swelling of the feet or ankles, dizziness, lightheadedness, unwanted hair growth and allergic reaction.  The patient verbalized understanding of the proper use and possible adverse effects of oral minoxidil.  All of the patient's questions and concerns were addressed. Hydroxychloroquine Pregnancy And Lactation Text: This medication has been shown to cause fetal harm but it isn't assigned a Pregnancy Risk Category. There are small amounts excreted in breast milk. Birth Control Pills Pregnancy And Lactation Text: This medication should be avoided if pregnant and for the first 30 days post-partum. Tazorac Counseling:  Patient advised that medication is irritating and drying.  Patient may need to apply sparingly and wash off after an hour before eventually leaving it on overnight.  The patient verbalized understanding of the proper use and possible adverse effects of tazorac.  All of the patient's questions and concerns were addressed. Minocycline Counseling: Patient advised regarding possible photosensitivity and discoloration of the teeth, skin, lips, tongue and gums.  Patient instructed to avoid sunlight, if possible.  When exposed to sunlight, patients should wear protective clothing, sunglasses, and sunscreen.  The patient was instructed to call the office immediately if the following severe adverse effects occur:  hearing changes, easy bruising/bleeding, severe headache, or vision changes.  The patient verbalized understanding of the proper use and possible adverse effects of minocycline.  All of the patient's questions and concerns were addressed. Albendazole Counseling:  I discussed with the patient the risks of albendazole including but not limited to cytopenia, kidney damage, nausea/vomiting and severe allergy.  The patient understands that this medication is being used in an off-label manner. Cyclosporine Counseling:  I discussed with the patient the risks of cyclosporine including but not limited to hypertension, gingival hyperplasia,myelosuppression, immunosuppression, liver damage, kidney damage, neurotoxicity, lymphoma, and serious infections. The patient understands that monitoring is required including baseline blood pressure, CBC, CMP, lipid panel and uric acid, and then 1-2 times monthly CMP and blood pressure. Cephalexin Pregnancy And Lactation Text: This medication is Pregnancy Category B and considered safe during pregnancy.  It is also excreted in breast milk but can be used safely for shorter doses. Terbinafine Counseling: Patient counseling regarding adverse effects of terbinafine including but not limited to headache, diarrhea, rash, upset stomach, liver function test abnormalities, itching, taste/smell disturbance, nausea, abdominal pain, and flatulence.  There is a rare possibility of liver failure that can occur when taking terbinafine.  The patient understands that a baseline LFT and kidney function test may be required. The patient verbalized understanding of the proper use and possible adverse effects of terbinafine.  All of the patient's questions and concerns were addressed. Acitretin Pregnancy And Lactation Text: This medication is Pregnancy Category X and should not be given to women who are pregnant or may become pregnant in the future. This medication is excreted in breast milk. Thalidomide Counseling: I discussed with the patient the risks of thalidomide including but not limited to birth defects, anxiety, weakness, chest pain, dizziness, cough and severe allergy. Libtayo Pregnancy And Lactation Text: This medication is contraindicated in pregnancy and when breast feeding. Taltz Counseling: I discussed with the patient the risks of ixekizumab including but not limited to immunosuppression, serious infections, worsening of inflammatory bowel disease and drug reactions.  The patient understands that monitoring is required including a PPD at baseline and must alert us or the primary physician if symptoms of infection or other concerning signs are noted. Imiquimod Counseling:  I discussed with the patient the risks of imiquimod including but not limited to erythema, scaling, itching, weeping, crusting, and pain.  Patient understands that the inflammatory response to imiquimod is variable from person to person and was educated regarded proper titration schedule.  If flu-like symptoms develop, patient knows to discontinue the medication and contact us. Winlevi Counseling:  I discussed with the patient the risks of topical clascoterone including but not limited to erythema, scaling, itching, and stinging. Patient voiced their understanding. Olumiant Pregnancy And Lactation Text: Based on animal studies, Olumiant may cause embryo-fetal harm when administered to pregnant women.  The medication should not be used in pregnancy.  Breastfeeding is not recommended during treatment. Rituxan Pregnancy And Lactation Text: This medication is Pregnancy Category C and it isn't know if it is safe during pregnancy. It is unknown if this medication is excreted in breast milk but similar antibodies are known to be excreted. Clofazimine Counseling:  I discussed with the patient the risks of clofazimine including but not limited to skin and eye pigmentation, liver damage, nausea/vomiting, gastrointestinal bleeding and allergy. Oral Minoxidil Pregnancy And Lactation Text: This medication should only be used when clearly needed if you are pregnant, attempting to become pregnant or breast feeding. Azelaic Acid Counseling: Patient counseled that medicine may cause skin irritation and to avoid applying near the eyes.  In the event of skin irritation, the patient was advised to reduce the amount of the drug applied or use it less frequently.   The patient verbalized understanding of the proper use and possible adverse effects of azelaic acid.  All of the patient's questions and concerns were addressed. Spironolactone Counseling: Patient advised regarding risks of diarrhea, abdominal pain, hyperkalemia, birth defects (for female patients), liver toxicity and renal toxicity. The patient may need blood work to monitor liver and kidney function and potassium levels while on therapy. The patient verbalized understanding of the proper use and possible adverse effects of spironolactone.  All of the patient's questions and concerns were addressed. Low Dose Naltrexone Counseling- I discussed with the patient the potential risks and side effects of low dose naltrexone including but not limited to: more vivid dreams, headaches, nausea, vomiting, abdominal pain, fatigue, dizziness, and anxiety. Tazorac Pregnancy And Lactation Text: This medication is not safe during pregnancy. It is unknown if this medication is excreted in breast milk. Dupixent Counseling: I discussed with the patient the risks of dupilumab including but not limited to eye infection and irritation, cold sores, injection site reactions, worsening of asthma, allergic reactions and increased risk of parasitic infection.  Live vaccines should be avoided while taking dupilumab. Dupilumab will also interact with certain medications such as warfarin and cyclosporine. The patient understands that monitoring is required and they must alert us or the primary physician if symptoms of infection or other concerning signs are noted. Fluconazole Counseling:  Patient counseled regarding adverse effects of fluconazole including but not limited to headache, diarrhea, nausea, upset stomach, liver function test abnormalities, taste disturbance, and stomach pain.  There is a rare possibility of liver failure that can occur when taking fluconazole.  The patient understands that monitoring of LFTs and kidney function test may be required, especially at baseline. The patient verbalized understanding of the proper use and possible adverse effects of fluconazole.  All of the patient's questions and concerns were addressed. Protopic Counseling: Patient may experience a mild burning sensation during topical application. Protopic is not approved in children less than 2 years of age. There have been case reports of hematologic and skin malignancies in patients using topical calcineurin inhibitors although causality is questionable. Bexarotene Counseling:  I discussed with the patient the risks of bexarotene including but not limited to hair loss, dry lips/skin/eyes, liver abnormalities, hyperlipidemia, pancreatitis, depression/suicidal ideation, photosensitivity, drug rash/allergic reactions, hypothyroidism, anemia, leukopenia, infection, cataracts, and teratogenicity.  Patient understands that they will need regular blood tests to check lipid profile, liver function tests, white blood cell count, thyroid function tests and pregnancy test if applicable. Clindamycin Counseling: I counseled the patient regarding use of clindamycin as an antibiotic for prophylactic and/or therapeutic purposes. Clindamycin is active against numerous classes of bacteria, including skin bacteria. Side effects may include nausea, diarrhea, gastrointestinal upset, rash, hives, yeast infections, and in rare cases, colitis. Rinvoq Counseling: I discussed with the patient the risks of Rinvoq therapy including but not limited to upper respiratory tract infections, shingles, cold sores, bronchitis, nausea, cough, fever, acne, and headache. Live vaccines should be avoided.  This medication has been linked to serious infections; higher rate of mortality; malignancy and lymphoproliferative disorders; major adverse cardiovascular events; thrombosis; thrombocytopenia, anemia, and neutropenia; lipid elevations; liver enzyme elevations; and gastrointestinal perforations. Siliq Counseling:  I discussed with the patient the risks of Siliq including but not limited to new or worsening depression, suicidal thoughts and behavior, immunosuppression, malignancy, posterior leukoencephalopathy syndrome, and serious infections.  The patient understands that monitoring is required including a PPD at baseline and must alert us or the primary physician if symptoms of infection or other concerning signs are noted. There is also a special program designed to monitor depression which is required with Siliq. Odomzo Counseling- I discussed with the patient the risks of Odomzo including but not limited to nausea, vomiting, diarrhea, constipation, weight loss, changes in the sense of taste, decreased appetite, muscle spasms, and hair loss.  The patient verbalized understanding of the proper use and possible adverse effects of Odomzo.  All of the patient's questions and concerns were addressed. Drysol Counseling:  I discussed with the patient the risks of drysol/aluminum chloride including but not limited to skin rash, itching, irritation, burning. Otezla Counseling: The side effects of Otezla were discussed with the patient, including but not limited to worsening or new depression, weight loss, diarrhea, nausea, upper respiratory tract infection, and headache. Patient instructed to call the office should any adverse effect occur.  The patient verbalized understanding of the proper use and possible adverse effects of Otezla.  All the patient's questions and concerns were addressed. Winlevi Pregnancy And Lactation Text: This medication is considered safe during pregnancy and breastfeeding. Low Dose Naltrexone Pregnancy And Lactation Text: Naltrexone is pregnancy category C.  There have been no adequate and well-controlled studies in pregnant women.  It should be used in pregnancy only if the potential benefit justifies the potential risk to the fetus.   Limited data indicates that naltrexone is minimally excreted into breastmilk. Topical Clindamycin Counseling: Patient counseled that this medication may cause skin irritation or allergic reactions.  In the event of skin irritation, the patient was advised to reduce the amount of the drug applied or use it less frequently.   The patient verbalized understanding of the proper use and possible adverse effects of clindamycin.  All of the patient's questions and concerns were addressed. Spironolactone Pregnancy And Lactation Text: This medication can cause feminization of the male fetus and should be avoided during pregnancy. The active metabolite is also found in breast milk. Quinolones Counseling:  I discussed with the patient the risks of fluoroquinolones including but not limited to GI upset, allergic reaction, drug rash, diarrhea, dizziness, photosensitivity, yeast infections, liver function test abnormalities, tendonitis/tendon rupture. Ivermectin Counseling:  Patient instructed to take medication on an empty stomach with a full glass of water.  Patient informed of potential adverse effects including but not limited to nausea, diarrhea, dizziness, itching, and swelling of the extremities or lymph nodes.  The patient verbalized understanding of the proper use and possible adverse effects of ivermectin.  All of the patient's questions and concerns were addressed. Methotrexate Counseling:  Patient counseled regarding adverse effects of methotrexate including but not limited to nausea, vomiting, abnormalities in liver function tests. Patients may develop mouth sores, rash, diarrhea, and abnormalities in blood counts. The patient understands that monitoring is required including LFT's and blood counts.  There is a rare possibility of scarring of the liver and lung problems that can occur when taking methotrexate. Persistent nausea, loss of appetite, pale stools, dark urine, cough, and shortness of breath should be reported immediately. Patient advised to discontinue methotrexate treatment at least three months before attempting to become pregnant.  I discussed the need for folate supplements while taking methotrexate.  These supplements can decrease side effects during methotrexate treatment. The patient verbalized understanding of the proper use and possible adverse effects of methotrexate.  All of the patient's questions and concerns were addressed. Dapsone Counseling: I discussed with the patient the risks of dapsone including but not limited to hemolytic anemia, agranulocytosis, rashes, methemoglobinemia, kidney failure, peripheral neuropathy, headaches, GI upset, and liver toxicity.  Patients who start dapsone require monitoring including baseline LFTs and weekly CBCs for the first month, then every month thereafter.  The patient verbalized understanding of the proper use and possible adverse effects of dapsone.  All of the patient's questions and concerns were addressed. Colchicine Counseling:  Patient counseled regarding adverse effects including but not limited to stomach upset (nausea, vomiting, stomach pain, or diarrhea).  Patient instructed to limit alcohol consumption while taking this medication.  Colchicine may reduce blood counts especially with prolonged use.  The patient understands that monitoring of kidney function and blood counts may be required, especially at baseline. The patient verbalized understanding of the proper use and possible adverse effects of colchicine.  All of the patient's questions and concerns were addressed. Protopic Pregnancy And Lactation Text: This medication is Pregnancy Category C. It is unknown if this medication is excreted in breast milk when applied topically. Dupixent Pregnancy And Lactation Text: This medication likely crosses the placenta but the risk for the fetus is uncertain. This medication is excreted in breast milk. Rinvoq Pregnancy And Lactation Text: Based on animal studies, Rinvoq may cause embryo-fetal harm when administered to pregnant women.  The medication should not be used in pregnancy.  Breastfeeding is not recommended during treatment and for 6 days after the last dose. Tranexamic Acid Counseling:  Patient advised of the small risk of bleeding problems with tranexamic acid. They were also instructed to call if they developed any nausea, vomiting or diarrhea. All of the patient's questions and concerns were addressed. Minoxidil Counseling: Minoxidil is a topical medication which can increase blood flow where it is applied. It is uncertain how this medication increases hair growth. Side effects are uncommon and include stinging and allergic reactions. Clindamycin Pregnancy And Lactation Text: This medication can be used in pregnancy if certain situations. Clindamycin is also present in breast milk. Tremfya Counseling: I discussed with the patient the risks of guselkumab including but not limited to immunosuppression, serious infections, and drug reactions.  The patient understands that monitoring is required including a PPD at baseline and must alert us or the primary physician if symptoms of infection or other concerning signs are noted. Otezla Pregnancy And Lactation Text: This medication is Pregnancy Category C and it isn't known if it is safe during pregnancy. It is unknown if it is excreted in breast milk. Cimetidine Counseling:  I discussed with the patient the risks of Cimetidine including but not limited to gynecomastia, headache, diarrhea, nausea, drowsiness, arrhythmias, pancreatitis, skin rashes, psychosis, bone marrow suppression and kidney toxicity. Humira Counseling:  I discussed with the patient the risks of adalimumab including but not limited to myelosuppression, immunosuppression, autoimmune hepatitis, demyelinating diseases, lymphoma, and serious infections.  The patient understands that monitoring is required including a PPD at baseline and must alert us or the primary physician if symptoms of infection or other concerning signs are noted.

## 2023-12-17 NOTE — ED ADULT NURSE REASSESSMENT NOTE - NS ED NURSE REASSESS COMMENT FT1
Report received from CATRINA Neal. Pt A&Ox4, denies pain or discomfort at this time. Medication infusing as ordered. Pt awaiting admission to surgical team and bed assignment. Will continue to monitor for safety and comfort. Private car

## 2024-06-25 NOTE — ED ADULT NURSE NOTE - CHIEF COMPLAINT
Whitney Isabel RN  6/25/2024  7:15 AM CDT Back to Top      Please notify patient of stable test results, thanks!    Teodoro Andre,   6/24/2024  5:06 PM CDT       Preop studies reviewed, may proceed with procedure    Teodoro Andre,   6/24/2024 11:00 AM CDT       Preop studies reviewed, may proceed with procedure      The patient is a 33y Female complaining of